# Patient Record
Sex: FEMALE | Race: WHITE | NOT HISPANIC OR LATINO | Employment: OTHER | URBAN - NONMETROPOLITAN AREA
[De-identification: names, ages, dates, MRNs, and addresses within clinical notes are randomized per-mention and may not be internally consistent; named-entity substitution may affect disease eponyms.]

---

## 2023-06-06 ENCOUNTER — HOSPITAL ENCOUNTER (EMERGENCY)
Facility: HOSPITAL | Age: 78
Discharge: HOME | End: 2023-06-07
Attending: EMERGENCY MEDICINE
Payer: MEDICARE

## 2023-06-06 ENCOUNTER — APPOINTMENT (EMERGENCY)
Dept: CT IMAGING | Facility: HOSPITAL | Age: 78
End: 2023-06-06
Payer: MEDICARE

## 2023-06-06 DIAGNOSIS — N20.1 LEFT URETERAL STONE: Primary | ICD-10-CM

## 2023-06-06 LAB
ALBUMIN SERPL BCP-MCNC: 4.4 G/DL (ref 3.5–5)
ALP SERPL-CCNC: 48 U/L (ref 34–104)
ALT SERPL W P-5'-P-CCNC: 20 U/L (ref 7–52)
ANION GAP SERPL CALCULATED.3IONS-SCNC: 13 MMOL/L (ref 4–13)
AST SERPL W P-5'-P-CCNC: 17 U/L (ref 13–39)
BASOPHILS # BLD AUTO: 0.04 THOUSANDS/ÂΜL (ref 0–0.1)
BASOPHILS NFR BLD AUTO: 0 % (ref 0–1)
BILIRUB SERPL-MCNC: 0.88 MG/DL (ref 0.2–1)
BILIRUB UR QL STRIP: NEGATIVE
BUN SERPL-MCNC: 23 MG/DL (ref 5–25)
CALCIUM SERPL-MCNC: 10 MG/DL (ref 8.4–10.2)
CHLORIDE SERPL-SCNC: 100 MMOL/L (ref 96–108)
CLARITY UR: CLEAR
CO2 SERPL-SCNC: 25 MMOL/L (ref 21–32)
COLOR UR: YELLOW
CREAT SERPL-MCNC: 1.09 MG/DL (ref 0.6–1.3)
EOSINOPHIL # BLD AUTO: 0.01 THOUSAND/ÂΜL (ref 0–0.61)
EOSINOPHIL NFR BLD AUTO: 0 % (ref 0–6)
ERYTHROCYTE [DISTWIDTH] IN BLOOD BY AUTOMATED COUNT: 12.9 % (ref 11.6–15.1)
GFR SERPL CREATININE-BSD FRML MDRD: 48 ML/MIN/1.73SQ M
GLUCOSE SERPL-MCNC: 197 MG/DL (ref 65–140)
GLUCOSE UR STRIP-MCNC: NEGATIVE MG/DL
HCT VFR BLD AUTO: 44.8 % (ref 34.8–46.1)
HGB BLD-MCNC: 14.7 G/DL (ref 11.5–15.4)
HGB UR QL STRIP.AUTO: ABNORMAL
IMM GRANULOCYTES # BLD AUTO: 0.03 THOUSAND/UL (ref 0–0.2)
IMM GRANULOCYTES NFR BLD AUTO: 0 % (ref 0–2)
KETONES UR STRIP-MCNC: ABNORMAL MG/DL
LACTATE SERPL-SCNC: 2.2 MMOL/L (ref 0.5–2)
LEUKOCYTE ESTERASE UR QL STRIP: NEGATIVE
LIPASE SERPL-CCNC: 7 U/L (ref 11–82)
LYMPHOCYTES # BLD AUTO: 0.78 THOUSANDS/ÂΜL (ref 0.6–4.47)
LYMPHOCYTES NFR BLD AUTO: 8 % (ref 14–44)
MCH RBC QN AUTO: 31.1 PG (ref 26.8–34.3)
MCHC RBC AUTO-ENTMCNC: 32.8 G/DL (ref 31.4–37.4)
MCV RBC AUTO: 95 FL (ref 82–98)
MONOCYTES # BLD AUTO: 0.66 THOUSAND/ÂΜL (ref 0.17–1.22)
MONOCYTES NFR BLD AUTO: 6 % (ref 4–12)
NEUTROPHILS # BLD AUTO: 8.83 THOUSANDS/ÂΜL (ref 1.85–7.62)
NEUTS SEG NFR BLD AUTO: 86 % (ref 43–75)
NITRITE UR QL STRIP: NEGATIVE
NRBC BLD AUTO-RTO: 0 /100 WBCS
PH UR STRIP.AUTO: 5.5 [PH]
PLATELET # BLD AUTO: 233 THOUSANDS/UL (ref 149–390)
PMV BLD AUTO: 12.4 FL (ref 8.9–12.7)
POTASSIUM SERPL-SCNC: 3.7 MMOL/L (ref 3.5–5.3)
PROCALCITONIN SERPL-MCNC: 0.06 NG/ML
PROT SERPL-MCNC: 6.9 G/DL (ref 6.4–8.4)
PROT UR STRIP-MCNC: NEGATIVE MG/DL
RBC # BLD AUTO: 4.72 MILLION/UL (ref 3.81–5.12)
SODIUM SERPL-SCNC: 138 MMOL/L (ref 135–147)
SP GR UR STRIP.AUTO: >=1.03 (ref 1–1.03)
UROBILINOGEN UR QL STRIP.AUTO: 0.2 E.U./DL
WBC # BLD AUTO: 10.35 THOUSAND/UL (ref 4.31–10.16)

## 2023-06-06 PROCEDURE — 81001 URINALYSIS AUTO W/SCOPE: CPT | Performed by: EMERGENCY MEDICINE

## 2023-06-06 PROCEDURE — G1004 CDSM NDSC: HCPCS

## 2023-06-06 PROCEDURE — 80053 COMPREHEN METABOLIC PANEL: CPT | Performed by: EMERGENCY MEDICINE

## 2023-06-06 PROCEDURE — 83690 ASSAY OF LIPASE: CPT | Performed by: EMERGENCY MEDICINE

## 2023-06-06 PROCEDURE — 84145 PROCALCITONIN (PCT): CPT | Performed by: EMERGENCY MEDICINE

## 2023-06-06 PROCEDURE — 36415 COLL VENOUS BLD VENIPUNCTURE: CPT | Performed by: EMERGENCY MEDICINE

## 2023-06-06 PROCEDURE — 93005 ELECTROCARDIOGRAM TRACING: CPT

## 2023-06-06 PROCEDURE — 74177 CT ABD & PELVIS W/CONTRAST: CPT

## 2023-06-06 PROCEDURE — 85025 COMPLETE CBC W/AUTO DIFF WBC: CPT | Performed by: EMERGENCY MEDICINE

## 2023-06-06 PROCEDURE — 83605 ASSAY OF LACTIC ACID: CPT | Performed by: EMERGENCY MEDICINE

## 2023-06-06 RX ORDER — FENTANYL CITRATE 50 UG/ML
50 INJECTION, SOLUTION INTRAMUSCULAR; INTRAVENOUS ONCE
Status: COMPLETED | OUTPATIENT
Start: 2023-06-06 | End: 2023-06-06

## 2023-06-06 RX ADMIN — SODIUM CHLORIDE 500 ML: 0.9 INJECTION, SOLUTION INTRAVENOUS at 23:25

## 2023-06-06 RX ADMIN — FENTANYL CITRATE 50 MCG: 50 INJECTION, SOLUTION INTRAMUSCULAR; INTRAVENOUS at 23:26

## 2023-06-06 RX ADMIN — IOHEXOL 100 ML: 350 INJECTION, SOLUTION INTRAVENOUS at 23:58

## 2023-06-07 ENCOUNTER — TELEPHONE (OUTPATIENT)
Dept: UROLOGY | Facility: AMBULATORY SURGERY CENTER | Age: 78
End: 2023-06-07

## 2023-06-07 VITALS
RESPIRATION RATE: 17 BRPM | WEIGHT: 216.71 LBS | TEMPERATURE: 98.2 F | OXYGEN SATURATION: 94 % | DIASTOLIC BLOOD PRESSURE: 61 MMHG | HEART RATE: 103 BPM | SYSTOLIC BLOOD PRESSURE: 132 MMHG

## 2023-06-07 LAB
ATRIAL RATE: 103 BPM
BACTERIA UR QL AUTO: NORMAL /HPF
NON-SQ EPI CELLS URNS QL MICRO: NORMAL /HPF
P AXIS: 62 DEGREES
PR INTERVAL: 188 MS
QRS AXIS: 45 DEGREES
QRSD INTERVAL: 82 MS
QT INTERVAL: 340 MS
QTC INTERVAL: 445 MS
RBC #/AREA URNS AUTO: NORMAL /HPF
T WAVE AXIS: 47 DEGREES
VENTRICULAR RATE: 103 BPM
WBC #/AREA URNS AUTO: NORMAL /HPF

## 2023-06-07 PROCEDURE — 93010 ELECTROCARDIOGRAM REPORT: CPT | Performed by: INTERNAL MEDICINE

## 2023-06-07 RX ORDER — TAMSULOSIN HYDROCHLORIDE 0.4 MG/1
0.4 CAPSULE ORAL ONCE
Status: COMPLETED | OUTPATIENT
Start: 2023-06-07 | End: 2023-06-07

## 2023-06-07 RX ORDER — ONDANSETRON 4 MG/1
4 TABLET, ORALLY DISINTEGRATING ORAL EVERY 6 HOURS PRN
Qty: 20 TABLET | Refills: 0 | Status: SHIPPED | OUTPATIENT
Start: 2023-06-07

## 2023-06-07 RX ORDER — TAMSULOSIN HYDROCHLORIDE 0.4 MG/1
0.4 CAPSULE ORAL
Qty: 10 CAPSULE | Refills: 0 | Status: SHIPPED | OUTPATIENT
Start: 2023-06-07

## 2023-06-07 RX ORDER — NAPROXEN 500 MG/1
500 TABLET ORAL 2 TIMES DAILY WITH MEALS
Qty: 20 TABLET | Refills: 0 | Status: SHIPPED | OUTPATIENT
Start: 2023-06-07

## 2023-06-07 RX ADMIN — TAMSULOSIN HYDROCHLORIDE 0.4 MG: 0.4 CAPSULE ORAL at 01:48

## 2023-06-07 NOTE — TELEPHONE ENCOUNTER
Called and spoke with patient  She wishes to transfer care to [facility]  She was scheduled for NP appointment with Lanette Mendez in [place] on 6/22 @ 1000  Office address provided

## 2023-06-07 NOTE — DISCHARGE INSTRUCTIONS
You have been seen for flank pain  Your CT demonstrates a left sided kidney stone  Please discontinue other NSAIDs and take naproxen for pain  Please take the zofran and flomax as prescribed  Return to the emergency department if you develop worsening pain, vomiting, fevers or any other symptoms of concern  Please follow up with urology by calling the number provided

## 2023-06-07 NOTE — ED PROVIDER NOTES
History  Chief Complaint   Patient presents with   • Abdominal Pain     Left  sided flank pain that radiated to front  Started around 3pm  No urinary frequency,urgency,burning  History of kidney stones  History of diverticulitis  Report arleen all day  Talon Dickerson is a 76y o  year old female with PMH of DM, HTN, GERD, kidney stones presenting to the [facility] ED for left flank/abdominal pain  Patient noticed onset of pain five fours prior to arrival  Pain located in left flank/LLQ of abdomen  Rated 7-9/10  Associated nausea, NB/NB vomiting and brown watery diarrhea  Patient denies fevers/chills  No dysuria/hematuria  Patient has taken ibuprofen at home for symptomatic treatment  Surgical history notable for cholecystectomy  History provided by:  Medical records and patient   used: No        Prior to Admission Medications   Prescriptions Last Dose Informant Patient Reported?  Taking?   aspirin (ECOTRIN LOW STRENGTH) 81 mg EC tablet   Yes No   Sig: Take 81 mg by mouth daily   citric acid-potassium citrate (POLYCITRA K) 1,100-334 mg/5 mL solution   Yes No   Sig: Take by mouth 3 (three) times a day with meals   esomeprazole (NexIUM) 40 MG capsule   Yes No   Sig: Take 40 mg by mouth every morning before breakfast   fluticasone (FLONASE) 50 mcg/act nasal spray   No No   Si spray into each nostril daily   meloxicam (MOBIC) 15 mg tablet   Yes No   Sig: Take 15 mg by mouth daily   metFORMIN (GLUCOPHAGE) 500 mg tablet   Yes No   Sig: Take 500 mg by mouth 2 (two) times a day with meals   metoprolol succinate (TOPROL-XL) 50 mg 24 hr tablet   Yes No   Sig: Take 50 mg by mouth daily   olmesartan-hydrochlorothiazide (BENICAR HCT) 20-12 5 MG per tablet   Yes No   Sig: Take 1 tablet by mouth daily      Facility-Administered Medications: None       Past Medical History:   Diagnosis Date   • Diabetes mellitus ([facility] [address] )    • Diverticulitis    • GERD (gastroesophageal reflux disease)    • Hypertension        No past surgical history on file  No family history on file  I have reviewed and agree with the history as documented  E-Cigarette/Vaping     E-Cigarette/Vaping Substances          Review of Systems   Constitutional: Negative for chills and fever  HENT: Negative for congestion  Respiratory: Negative for shortness of breath  Cardiovascular: Negative for chest pain  Gastrointestinal: Positive for abdominal pain, diarrhea, nausea and vomiting  Negative for anal bleeding  Genitourinary: Positive for flank pain  Negative for dysuria and hematuria  Musculoskeletal: Negative for back pain  Skin: Negative for rash  Neurological: Negative for weakness  Psychiatric/Behavioral: Negative for confusion  All other systems reviewed and are negative  Physical Exam  Physical Exam  Vitals and nursing note reviewed  Constitutional:       General: She is not in acute distress  Appearance: Normal appearance  She is well-developed  She is not ill-appearing, toxic-appearing or diaphoretic  HENT:      Head: Normocephalic and atraumatic  Nose: No congestion or rhinorrhea  Eyes:      General:         Right eye: No discharge  Left eye: No discharge  Cardiovascular:      Rate and Rhythm: Regular rhythm  Tachycardia present  Pulmonary:      Effort: Pulmonary effort is normal  No accessory muscle usage or respiratory distress  Breath sounds: Normal breath sounds  No stridor  No decreased breath sounds, wheezing, rhonchi or rales  Abdominal:      General: There is no distension  Palpations: Abdomen is soft  Tenderness: There is abdominal tenderness in the left lower quadrant  There is no right CVA tenderness, left CVA tenderness, guarding or rebound  Musculoskeletal:      Cervical back: Normal range of motion and neck supple  No rigidity  Right lower leg: No tenderness  Left lower leg: No tenderness     Skin:     Capillary Refill: Capillary refill takes less than 2 seconds  Findings: No rash  Neurological:      Mental Status: She is alert and oriented to person, place, and time     Psychiatric:         Mood and Affect: Mood normal          Behavior: Behavior normal          Vital Signs  ED Triage Vitals   Temperature Pulse Respirations Blood Pressure SpO2   06/06/23 2245 06/06/23 2241 06/06/23 2241 06/06/23 2245 06/06/23 2241   98 2 °F (36 8 °C) (!) 115 18 159/77 96 %      Temp Source Heart Rate Source Patient Position - Orthostatic VS BP Location FiO2 (%)   06/06/23 2245 06/06/23 2241 06/06/23 2241 06/06/23 2241 --   Temporal Monitor Lying Left arm       Pain Score       06/06/23 2241       7           Vitals:    06/06/23 2245 06/06/23 2340 06/07/23 0110 06/07/23 0140   BP: 159/77 148/65 144/83 132/61   Pulse:  98 97 103   Patient Position - Orthostatic VS:             Visual Acuity      ED Medications  Medications   sodium chloride 0 9 % bolus 500 mL (0 mL Intravenous Stopped 6/7/23 0025)   fentanyl citrate (PF) 100 MCG/2ML 50 mcg (50 mcg Intravenous Given 6/6/23 2326)   iohexol (OMNIPAQUE) 350 MG/ML injection (SINGLE-DOSE) 100 mL (100 mL Intravenous Given 6/6/23 2358)   tamsulosin (FLOMAX) capsule 0 4 mg (0 4 mg Oral Given 6/7/23 0148)       Diagnostic Studies  Results Reviewed     Procedure Component Value Units Date/Time    Urine Microscopic [[id number]]  (Normal) Collected: 06/06/23 2258    Lab Status: Final result Specimen: Urine, Clean Catch Updated: 06/07/23 0004     RBC, UA 1-2 /hpf      WBC, UA 1-2 /hpf      Epithelial Cells None Seen /hpf      Bacteria, UA None Seen /hpf     UA w Reflex to Microscopic w Reflex to Culture [[id number]]  (Abnormal) Collected: 06/06/23 2258    Lab Status: Final result Specimen: Urine, Clean Catch Updated: 06/06/23 2348     Color, UA Yellow     Clarity, UA Clear     Specific Gravity, UA >=1 030     pH, UA 5 5     Leukocytes, UA Negative     Nitrite, UA Negative     Protein, UA Negative mg/dl Glucose, UA Negative mg/dl      Ketones, UA 40 (2+) mg/dl      Urobilinogen, UA 0 2 E U /dl      Bilirubin, UA Negative     Occult Blood, UA Trace-Intact    Lactic acid, plasma (w/reflex if result > 2 0) [[id number]]  (Abnormal) Collected: 06/06/23 2256    Lab Status: Final result Specimen: Blood from Arm, Left Updated: 06/06/23 2340     LACTIC ACID 2 2 mmol/L     Narrative:      Result may be elevated if tourniquet was used during collection      Lactic acid 2 Hours [[id number]]     Lab Status: No result Specimen: Blood     Procalcitonin [[id number]]  (Normal) Collected: 06/06/23 2256    Lab Status: Final result Specimen: Blood from Arm, Left Updated: 06/06/23 2333     Procalcitonin 0 06 ng/ml     Comprehensive metabolic panel [[id number]]  (Abnormal) Collected: 06/06/23 2256    Lab Status: Final result Specimen: Blood from Arm, Left Updated: 06/06/23 2324     Sodium 138 mmol/L      Potassium 3 7 mmol/L      Chloride 100 mmol/L      CO2 25 mmol/L      ANION GAP 13 mmol/L      BUN 23 mg/dL      Creatinine 1 09 mg/dL      Glucose 197 mg/dL      Calcium 10 0 mg/dL      AST 17 U/L      ALT 20 U/L      Alkaline Phosphatase 48 U/L      Total Protein 6 9 g/dL      Albumin 4 4 g/dL      Total Bilirubin 0 88 mg/dL      eGFR 48 ml/min/1 73sq m     Narrative:      [place] guidelines for Chronic Kidney Disease (CKD):   •  Stage 1 with normal or high GFR (GFR > 90 mL/min/1 73 square meters)  •  Stage 2 Mild CKD (GFR = 60-89 mL/min/1 73 square meters)  •  Stage 3A Moderate CKD (GFR = 45-59 mL/min/1 73 square meters)  •  Stage 3B Moderate CKD (GFR = 30-44 mL/min/1 73 square meters)  •  Stage 4 Severe CKD (GFR = 15-29 mL/min/1 73 square meters)  •  Stage 5 End Stage CKD (GFR <15 mL/min/1 73 square meters)  Note: GFR calculation is accurate only with a steady state creatinine    Lipase [[id number]]  (Abnormal) Collected: 06/06/23 2256    Lab Status: Final result Specimen: Blood from Arm, Left Updated: 06/06/23 2324     Lipase 7 u/L     CBC and differential [[id number]]  (Abnormal) Collected: 06/06/23 2256    Lab Status: Final result Specimen: Blood from Arm, Left Updated: 06/06/23 2303     WBC 10 35 Thousand/uL      RBC 4 72 Million/uL      Hemoglobin 14 7 g/dL      Hematocrit 44 8 %      MCV 95 fL      MCH 31 1 pg      MCHC 32 8 g/dL      RDW 12 9 %      MPV 12 4 fL      Platelets 642 Thousands/uL      nRBC 0 /100 WBCs      Neutrophils Relative 86 %      Immat GRANS % 0 %      Lymphocytes Relative 8 %      Monocytes Relative 6 %      Eosinophils Relative 0 %      Basophils Relative 0 %      Neutrophils Absolute 8 83 Thousands/µL      Immature Grans Absolute 0 03 Thousand/uL      Lymphocytes Absolute 0 78 Thousands/µL      Monocytes Absolute 0 66 Thousand/µL      Eosinophils Absolute 0 01 Thousand/µL      Basophils Absolute 0 04 Thousands/µL                  CT abdomen pelvis with contrast   ED Interpretation by Laisha Arroyo DO (06/07 0002)   Left hydroureteronephrosis due to obstructing midureteral stone  Final Result by Bonifacio Enriquez MD (06/07 0130)      3 mm stone in the proximal left ureter resulting left hydroureteronephrosis  There is a nonobstructing 4 mm stone in the left renal collecting system         The study was marked in EPIC for immediate notification  Workstation performed: [id number]                    Procedures  Procedures         ED Course  ED Course as of 06/07/23 0253   Tue Jun 06, 2023 2319 Procedure Note: EKG  Date/Time: 06/06/23 11:19 PM   Interpreted by: Laisha Arroyo DO  Indications / Diagnosis: Dyspnea  ECG reviewed by me, the ED Provider: yes   The EKG demonstrates:  Rhythm: sinus tachycardia 103 BPM  Intervals: Normal CA and QT intervals  Axis: Normal axis  QRS/Blocks: Normal QRS  ST Changes: No acute ST/T waves changes  No TILA  No TWI  Wed Jun 07, 2023   0018 Patient reassessed  Pain resolved  Vital stable  Reviewed labs with patient  Pending CT for dispo     4212 Patient continues to deny pain  Well appearing  Reviewed CT results with patient  Will plan for discharge with outpatient urology f/u  SBIRT 22yo+    Flowsheet Row Most Recent Value   Initial Alcohol Screen: US AUDIT-C     1  How often do you have a drink containing alcohol? 0 Filed at: 06/06/2023 2237   Audit-C Score 0 Filed at: 06/06/2023 2237                    Medical Decision Making    76 y o  female presenting for abdominal pain, vomiting and diarrhea  Tachycardia noted, otherwise VSS  Patient nontoxic appearing  Will order labs, UA and CT to evaluate for kidney stones, pyelonephritis, diverticulitis or other etiology of pain  Lower suspicion for AAA, appendicitis, SBO or mesenteric ischemia  Lower suspicion for ACS though given age will order EKG to screen for arrhythmia or ACS  DDX includes gastroenteritis  Reassessment: Pain resolved during ED course  Vitals within normal limits  Reviewed labs and CT results with the patient and family  3 mm distal ureteral stone with resultant hydronephrosis  Nausea and pain controlled, no signs/symptoms of UTI and no STACY  Will plan for outpatient treatment and prompt urology follow-up  Disposition: I have discussed with the patient our plan to discharge them from the ED and the patient is in agreement with this plan  Discharge Plan: Prescription for Flomax, naproxen, Zofran  Discussed possibility of disease progression and failure of outpatient treatment  RTED precautions emphasized  The patient was provided a written after visit summary with strict RTED precautions  Followup: I have discussed with the patient plan to follow up with Urology  Contact information provided in AVS     Amount and/or Complexity of Data Reviewed  Labs: ordered  Radiology: ordered and independent interpretation performed  Risk  Prescription drug management            Disposition  Final diagnoses:   Left ureteral stone     Time reflects when diagnosis was documented in both MDM as applicable and the Disposition within this note     Time User Action Codes Description Comment    6/7/2023  1:36 AM Hieu Cano Add [N20 1] Left ureteral stone       ED Disposition     ED Disposition   Discharge    Condition   Stable    Date/Time   Wed Jun 7, 2023  1:35 AM    Comment   Arcelialarisa Van discharge to home/self care  Follow-up Information     Follow up With Specialties Details Why Contact Info Additional [address] [facility] Schedule an appointment as soon as possible for a visit  To make appointment for reevaluation in 3-5 days   [address] [facility], [address] [place] [address], [address] [place] [address]          Discharge Medication List as of 6/7/2023  1:37 AM      START taking these medications    Details   naproxen (Naprosyn) 500 mg tablet Take 1 tablet (500 mg total) by mouth 2 (two) times a day with meals, Starting Wed 6/7/2023, Normal      ondansetron (ZOFRAN-ODT) 4 mg disintegrating tablet Take 1 tablet (4 mg total) by mouth every 6 (six) hours as needed for nausea or vomiting, Starting Wed 6/7/2023, Normal      tamsulosin (FLOMAX) 0 4 mg Take 1 capsule (0 4 mg total) by mouth daily with dinner, Starting Wed 6/7/2023, Normal         CONTINUE these medications which have NOT CHANGED    Details   aspirin (ECOTRIN LOW STRENGTH) 81 mg EC tablet Take 81 mg by mouth daily, Historical Med      citric acid-potassium citrate (POLYCITRA K) 1,100-334 mg/5 mL solution Take by mouth 3 (three) times a day with meals, Historical Med      esomeprazole (NexIUM) 40 MG capsule Take 40 mg by mouth every morning before breakfast, Historical Med      fluticasone (FLONASE) 50 mcg/act nasal spray 1 spray into each nostril daily, Starting Sun 4/16/2023, Normal      meloxicam (MOBIC) 15 mg tablet Take 15 mg by mouth daily, Historical Med      metFORMIN (GLUCOPHAGE) 500 mg tablet Take 500 mg by mouth 2 (two) times a day with meals, Historical Med      metoprolol succinate (TOPROL-XL) 50 mg 24 hr tablet Take 50 mg by mouth daily, Historical Med      olmesartan-hydrochlorothiazide (BENICAR HCT) 20-12 5 MG per tablet Take 1 tablet by mouth daily, Historical Med                 PDMP Review     None          ED Provider  Electronically Signed by           Krunal Aguirre DO  06/07/23 4483

## 2023-06-07 NOTE — TELEPHONE ENCOUNTER
New Patient    What is the reason for the patient’s appointment?: referral ER Kidney stones     She said she is feeling better and does have a history of kidney stones in the past  CT shoed 2 mm stone in left ureter left hydroureteronephrosis 4 mm stone in left    What office location does the patient prefer?: [facility]    Does patient have Imaging/Lab Results:  yes    Have patient records been requested?: no  If No, are the records showing in Epic: yes      INSURANCE:  Do we accept the patient's insurance or is the patient Self-Pay?: yes    Insurance Provider: [email or web address]  Plan Type/Number:  Member ID#:       HISTORY:   Has the patient had any previous Urologist(s)?: yes [facility]    Was the patient seen in the ED?: yes    Has the patient had any outside testing done?: no    Does the patient have a personal history of cancer?: no    Pt can be reached at [phone]

## 2023-06-22 ENCOUNTER — OFFICE VISIT (OUTPATIENT)
Dept: UROLOGY | Facility: CLINIC | Age: 78
End: 2023-06-22
Payer: MEDICARE

## 2023-06-22 VITALS
WEIGHT: 214 LBS | HEIGHT: 62 IN | BODY MASS INDEX: 39.38 KG/M2 | SYSTOLIC BLOOD PRESSURE: 114 MMHG | DIASTOLIC BLOOD PRESSURE: 72 MMHG

## 2023-06-22 DIAGNOSIS — N20.1 URETEROLITHIASIS: Primary | ICD-10-CM

## 2023-06-22 PROCEDURE — 99203 OFFICE O/P NEW LOW 30 MIN: CPT

## 2023-06-22 PROCEDURE — 82360 CALCULUS ASSAY QUANT: CPT

## 2023-06-22 RX ORDER — BLOOD-GLUCOSE METER
KIT MISCELLANEOUS
COMMUNITY
Start: 2023-03-18

## 2023-06-22 RX ORDER — POTASSIUM CITRATE 10 MEQ/1
TABLET, EXTENDED RELEASE ORAL
COMMUNITY
Start: 2023-06-08

## 2023-06-22 RX ORDER — BLOOD-GLUCOSE METER
KIT MISCELLANEOUS
COMMUNITY
Start: 2023-05-01

## 2023-06-22 RX ORDER — METOPROLOL TARTRATE 50 MG/1
TABLET, FILM COATED ORAL
COMMUNITY
Start: 2023-06-08

## 2023-06-22 RX ORDER — RIBOFLAVIN (VITAMIN B2) 100 MG
100 TABLET ORAL DAILY
COMMUNITY

## 2023-06-22 NOTE — PROGRESS NOTES
6/22/2023    No chief complaint on file  Assessment and Plan    76 y o  female new patient to office    1  Ureterolithiasis  · On 6/6/2023 for left flank pain, CT imaging showed 3 mm stone in the proximal left ureter resulting in left-sided hydronephrosis  · She was able to spontaneously pass this stone about 2 weeks ago and was able to collect it and we will send this for stone analysis  · She is asymptomatic today and denies any gross hematuria  I am recommending a Renal US to ensure resolution of the hydronephrosis despite being pain free  If this is resolved, she can then follow-up in 1 year  · Continue potasium citrate          History of Present Illness  Cassidy Sanz is a 76 y o  female new patient to office  Here for evaluation of     Emergency department visit on 6/6/2023 at [address] [facility] for left-sided flank pain with radiation to the abdomen  CT imaging showed 3 mm stone in the proximal left ureter resulting left hydroureteronephrosis  There is a nonobstructing 4 mm stone in the left renal collecting system  Review of labs showed a very slight leukocytosis of 10 35, normal renal function, and urine negative for infection  She does have a history of kidney stones and had previously been following with [facility] urology last seen by them in August 2022  Her first stone episode was in the early 2000's then in 2015 and 2017  She had 2 lithotripsy procedures with last in 2017  She is on K citrate 20 mill equivalents twice daily  Review of Systems   Constitutional: Negative for chills and fever  HENT: Negative for ear pain and sore throat  Eyes: Negative for pain and visual disturbance  Respiratory: Negative for cough and shortness of breath  Cardiovascular: Negative for chest pain and palpitations  Gastrointestinal: Negative for abdominal pain and vomiting  Genitourinary: Negative for dysuria and hematuria  Musculoskeletal: Negative for arthralgias and back pain  "  Skin: Negative for color change and rash  Neurological: Negative for seizures and syncope  All other systems reviewed and are negative  Vitals  Vitals:    06/22/23 0955   BP: 114/72   BP Location: Right arm   Patient Position: Sitting   Cuff Size: Large   Weight: 97 1 kg (214 lb)   Height: 5' 2\" (1 575 m)       Physical Exam  Vitals reviewed  Constitutional:       General: She is not in acute distress  Appearance: Normal appearance  She is normal weight  She is not ill-appearing or toxic-appearing  HENT:      Head: Normocephalic and atraumatic  Nose: Nose normal    Eyes:      General: No scleral icterus  Conjunctiva/sclera: Conjunctivae normal    Cardiovascular:      Rate and Rhythm: Normal rate  Pulses: Normal pulses  Pulmonary:      Effort: Pulmonary effort is normal  No respiratory distress  Abdominal:      General: Abdomen is flat  Palpations: Abdomen is soft  Tenderness: There is no abdominal tenderness  There is no right CVA tenderness or left CVA tenderness  Hernia: No hernia is present  Musculoskeletal:         General: Normal range of motion  Cervical back: Normal range of motion  Skin:     General: Skin is warm and dry  Neurological:      General: No focal deficit present  Mental Status: She is alert and oriented to person, place, and time  Mental status is at baseline  Psychiatric:         Mood and Affect: Mood normal          Behavior: Behavior normal          Thought Content: Thought content normal          Judgment: Judgment normal          Past History  Past Medical History:   Diagnosis Date   • Diabetes mellitus ([facility] )    • Diverticulitis    • GERD (gastroesophageal reflux disease)    • Hypertension      Social History     Socioeconomic History   • Marital status:       Spouse name: None   • Number of children: None   • Years of education: None   • Highest education level: None   Occupational History   • None   Tobacco " Use   • Smoking status: Never   • Smokeless tobacco: Never   Vaping Use   • Vaping Use: Never used   Substance and Sexual Activity   • Alcohol use: None   • Drug use: None   • Sexual activity: None   Other Topics Concern   • None   Social History Narrative   • None     Social Determinants of Health     Financial Resource Strain: Not on file   Food Insecurity: Not on file   Transportation Needs: Not on file   Physical Activity: Not on file   Stress: Not on file   Social Connections: Not on file   Intimate Partner Violence: Not on file   Housing Stability: Not on file     Social History     Tobacco Use   Smoking Status Never   Smokeless Tobacco Never     History reviewed  No pertinent family history  The following portions of the patient's history were reviewed and updated as appropriate allergies, current medications, past medical history, past social history, past surgical history and problem list    [email or web address]  CT ABDOMEN AND PELVIS WITH IV CONTRAST     INDICATION:   LLQ abdominal pain  Left flank and LLQ pain  Eval for diverticulitis, kidney stone        COMPARISON:  None      TECHNIQUE:  CT examination of the abdomen and pelvis was performed  Multiplanar 2D reformatted images were created from the source data      This examination, like all CT scans performed in the [facility], was performed utilizing techniques to minimize radiation dose exposure, including the use of iterative reconstruction and automated exposure control   Radiation dose length   product (DLP) for this visit:  1052 mGy-cm     IV Contrast:  100 mL of iohexol (OMNIPAQUE)  Enteric Contrast:  Enteric contrast was not administered      FINDINGS:     ABDOMEN     LOWER CHEST: Cardiomegaly is noted      LIVER/BILIARY TREE:  Unremarkable      GALLBLADDER: Gallbladder is surgically absent      SPLEEN:  Unremarkable      PANCREAS:  Unremarkable      ADRENAL GLANDS:  Unremarkable      KIDNEYS/URETERS: 3 mm stone in the "proximal left ureter resulting left hydroureteronephrosis  There is a nonobstructing 4 mm stone in the left renal collecting system        STOMACH AND BOWEL:  Unremarkable      APPENDIX:  No findings to suggest appendicitis      ABDOMINOPELVIC CAVITY:  No ascites  No pneumoperitoneum  No lymphadenopathy      VESSELS:  Unremarkable for patient's age      PELVIS     REPRODUCTIVE ORGANS:  Unremarkable for patient's age      URINARY BLADDER:  Unremarkable      ABDOMINAL WALL/INGUINAL REGIONS: Fat-containing umbilical hernia is noted    There is a 7 8 x 3 9 cm lipoma in the left anterior thigh      OSSEOUS STRUCTURES:  No acute fracture or destructive osseous lesion      IMPRESSION:     3 mm stone in the proximal left ureter resulting left hydroureteronephrosis  There is a nonobstructing 4 mm stone in the left renal collecting system  Clydegene Stubbs Results  No results found for this or any previous visit (from the past 1 hour(s))  ]  No results found for: \"PSA\"  Lab Results   Component Value Date    CALCIUM 10 0 06/06/2023    K 3 7 06/06/2023    CO2 25 06/06/2023     06/06/2023    BUN 23 06/06/2023    CREATININE 1 09 06/06/2023     Lab Results   Component Value Date    WBC 10 35 (H) 06/06/2023    HGB 14 7 06/06/2023    HCT 44 8 06/06/2023    MCV 95 06/06/2023     06/06/2023       Please Note:  Voice dictation software has been used to create this document  There may be inadvertent transcriptions errors       ADRIANA Paulson 06/22/23   "

## 2024-06-25 ENCOUNTER — OFFICE VISIT (OUTPATIENT)
Dept: UROLOGY | Facility: CLINIC | Age: 79
End: 2024-06-25
Payer: MEDICARE

## 2024-06-25 VITALS
WEIGHT: 208 LBS | BODY MASS INDEX: 38.04 KG/M2 | DIASTOLIC BLOOD PRESSURE: 74 MMHG | HEART RATE: 68 BPM | SYSTOLIC BLOOD PRESSURE: 136 MMHG

## 2024-06-25 DIAGNOSIS — N20.0 NEPHROLITHIASIS: Primary | ICD-10-CM

## 2024-06-25 PROCEDURE — 99213 OFFICE O/P EST LOW 20 MIN: CPT

## 2024-06-25 RX ORDER — METFORMIN HYDROCHLORIDE 500 MG/1
500 TABLET, EXTENDED RELEASE ORAL
COMMUNITY
Start: 2024-05-20

## 2024-06-25 NOTE — PROGRESS NOTES
6/25/2024    No chief complaint on file.      Assessment and Plan    78 y.o. female manage by    1.  Nephrolithiasis  Renal ultrasound from July 2023 showed no stones bilaterally.  I recommend checking an updated ultrasound and if she remains stone free we can discontinue her potassium citrate.  Follow-up in 1 year                Interval HPI:    She presents today reporting doing well and denies any issues with recently passed stones.  We reviewed her ultrasound from July of last year which showed no stone disease bilaterally.  She continues with potassium citrate.        History of Present Illness  Kim Kwon is a 78 y.o. female here for annual follow-up evaluation of history of nephrolithiasis.    Established patient last seen by me in June 2023 after she had an acute ER visit on 6/6/2023 at [facility] for left-sided flank pain.  CT imaging showed a 3 mm stone in the proximal left ureter resulting in left-sided hydronephrosis.  She was able to spontaneously pass the stone about 2 weeks prior to her last visit.  I recommended a renal ultrasound to ensure resolution of hydronephrosis despite being pain-free.  This was completed on 7/3/2023 and showed no evidence of renal calculi or hydronephrosis bilaterally.  She has 2 left renal cyst measuring 1.7 cm in the midpole and lower pole.    She does have a history of kidney stones and had previously been following with [facility] urology last seen by them in August 2022. Her first stone episode was in the early 2000's then in 2015 and 2017. She had 2 lithotripsy procedures with last in 2017. She is on K citrate 20 mill equivalents twice daily.         Review of Systems   Constitutional:  Negative for chills and fever.   HENT:  Negative for ear pain and sore throat.    Eyes:  Negative for pain and visual disturbance.   Respiratory:  Negative for cough and shortness of breath.    Cardiovascular:  Negative for chest pain and palpitations.   Gastrointestinal:   Negative for abdominal pain and vomiting.   Genitourinary:  Negative for dysuria and hematuria.   Musculoskeletal:  Negative for arthralgias and back pain.   Skin:  Negative for color change and rash.   Neurological:  Negative for seizures and syncope.   All other systems reviewed and are negative.              Vitals  Vitals:    06/25/24 0927 06/25/24 0933   BP:  136/74   Pulse:  68   Weight: 94.3 kg (208 lb)        Physical Exam  Vitals reviewed.   Constitutional:       General: She is not in acute distress.     Appearance: Normal appearance. She is normal weight. She is not ill-appearing or toxic-appearing.   HENT:      Head: Normocephalic and atraumatic.      Nose: Nose normal.   Eyes:      General: No scleral icterus.     Conjunctiva/sclera: Conjunctivae normal.   Cardiovascular:      Rate and Rhythm: Normal rate.      Pulses: Normal pulses.   Pulmonary:      Effort: Pulmonary effort is normal. No respiratory distress.   Abdominal:      General: Abdomen is flat.      Palpations: Abdomen is soft.      Tenderness: There is no abdominal tenderness. There is no right CVA tenderness or left CVA tenderness.      Hernia: No hernia is present.   Musculoskeletal:         General: Normal range of motion.      Cervical back: Normal range of motion.   Skin:     General: Skin is warm and dry.   Neurological:      General: No focal deficit present.      Mental Status: She is alert and oriented to person, place, and time. Mental status is at baseline.   Psychiatric:         Mood and Affect: Mood normal.         Behavior: Behavior normal.         Thought Content: Thought content normal.         Judgment: Judgment normal.         Past History  Past Medical History:   Diagnosis Date    Diabetes mellitus (HCC)     Diverticulitis     GERD (gastroesophageal reflux disease)     Hypertension      Social History     Socioeconomic History    Marital status:      Spouse name: None    Number of children: None    Years of  education: None    Highest education level: None   Occupational History    None   Tobacco Use    Smoking status: Never    Smokeless tobacco: Never   Vaping Use    Vaping status: Never Used   Substance and Sexual Activity    Alcohol use: None    Drug use: None    Sexual activity: None   Other Topics Concern    None   Social History Narrative    None     Social Determinants of Health     Financial Resource Strain: Low Risk  (8/23/2023)    Received from [facility]    Overall Financial Resource Strain (CARDIA)     Difficulty of Paying Living Expenses: Not hard at all   Food Insecurity: No Food Insecurity (8/23/2023)    Received from [facility]    Hunger Vital Sign     Worried About Running Out of Food in the Last Year: Never true     Ran Out of Food in the Last Year: Never true   Transportation Needs: No Transportation Needs (8/23/2023)    Received from [facility]    PRAPARE - Transportation     Lack of Transportation (Medical): No     Lack of Transportation (Non-Medical): No   Physical Activity: Not on file   Stress: No Stress Concern Present (8/23/2023)    Received from [facility]    [employer] - Occupational Stress Questionnaire     Feeling of Stress : Not at all   Social Connections: Unknown (6/18/2024)    Received from [facility]     How often do you feel lonely or isolated from those around you? (Adult - for ages 18 years and over): Not on file   Intimate Partner Violence: Not At Risk (8/23/2023)    Received from [facility]    Humiliation, Afraid, Rape, and Kick questionnaire     Fear of Current or Ex-Partner: No     Emotionally Abused: No     Physically Abused: No     Sexually Abused: No   Housing Stability: Not on file     Social History     Tobacco Use   Smoking Status Never   Smokeless Tobacco Never     History reviewed. No pertinent family history.    The following  "portions of the patient's history were reviewed and updated as appropriate allergies, current medications, past medical history, past social history, past surgical history and problem list    [email or web address]    US KIDNEYS/RENAL  Order: [id number]  Impression    Impression:  1. No evidence of renal calculi or hydronephrosis.  2. Persistent left renal cysts.          Workstation:[id number]  Narrative    History: Evaluate for kidney stones.    Technique: Color-flow Doppler and grayscale imaging was obtained through each  kidney as well as the bladder.    Comparison: Renal ultrasound on 2021.    Findings: The kidneys measure 9.5 x 5.1 x 4.5 cm on the right and 10.5 x 5.4 x  3.5 cm on the left.    Each kidney is free of calculus or hydronephrosis. There are no cysts or masses  on the right. Left renal cysts are again noted measuring 1.7 x 1.1 x 0.9 cm at  the mid pole and 1.7 x 1.2 x 0.9 cm on the lower pole.    Bladder is smoothly marginated without wall thickening, mass or calculus.      Results  No results found for this or any previous visit (from the past 1 hour(s)).]  No results found for: \"PSA\"  Lab Results   Component Value Date    CALCIUM 10.1 2023    K 4.2 2023    CO2 28 2023     2023    BUN 25 2023    CREATININE 0.93 2023     Lab Results   Component Value Date    WBC 10.35 (H) 2023    HGB 14.7 2023    HCT 44.8 2023    MCV 95 2023     2023       Please Note:  Voice dictation software has been used to create this document. There may be inadvertent transcriptions errors.     ADRIANA Ngo 24   "

## 2024-07-10 ENCOUNTER — TELEPHONE (OUTPATIENT)
Dept: UROLOGY | Facility: CLINIC | Age: 79
End: 2024-07-10

## 2024-07-10 NOTE — TELEPHONE ENCOUNTER
----- Message from ADRIANA Boykin sent at 7/10/2024  8:57 AM EDT -----  Please let patient know that her renal ultrasound confirms no stones.  She can discontinue potassium citrate.  Her ultrasound does show bilateral simple renal cyst.  These do not require further imaging or workup.

## 2024-07-10 NOTE — TELEPHONE ENCOUNTER
Per communication consent left detailed message of provider's result note. Office number provided if any questions.

## 2024-10-12 ENCOUNTER — OFFICE VISIT (OUTPATIENT)
Dept: URGENT CARE | Facility: CLINIC | Age: 79
End: 2024-10-12
Payer: MEDICARE

## 2024-10-12 VITALS
OXYGEN SATURATION: 98 % | SYSTOLIC BLOOD PRESSURE: 142 MMHG | DIASTOLIC BLOOD PRESSURE: 75 MMHG | TEMPERATURE: 97.9 F | HEART RATE: 78 BPM | RESPIRATION RATE: 18 BRPM

## 2024-10-12 DIAGNOSIS — J01.40 ACUTE PANSINUSITIS, RECURRENCE NOT SPECIFIED: Primary | ICD-10-CM

## 2024-10-12 PROCEDURE — G0463 HOSPITAL OUTPT CLINIC VISIT: HCPCS

## 2024-10-12 PROCEDURE — 99213 OFFICE O/P EST LOW 20 MIN: CPT

## 2024-10-12 RX ORDER — IBUPROFEN 200 MG
200 TABLET ORAL EVERY 6 HOURS PRN
COMMUNITY

## 2024-10-12 RX ORDER — KETOROLAC TROMETHAMINE 5 MG/ML
1 SOLUTION OPHTHALMIC 4 TIMES DAILY
COMMUNITY
Start: 2024-10-04

## 2024-10-12 NOTE — PROGRESS NOTES
[facility]        NAME: Kim Kwon is a 78 y.o. female  : 1945    MRN: [id number]  DATE: 2024  TIME: 12:38 PM    Assessment and Plan   Acute pansinusitis, recurrence not specified [J01.40]  1. Acute pansinusitis, recurrence not specified  amoxicillin-clavulanate (AUGMENTIN) 875-125 mg per tablet            Patient Instructions     Take the antibiotics as ordered twice daily until completion  If your symptoms worsen you will need to be rechecked  Follow up with pcp in about 5 days for a recheck if you are not getting better  Follow up with PCP in 3-5 days.  Proceed to  ER if symptoms worsen.    If tests are performed, our office will contact you with results only if changes need to made to the care plan discussed with you at the visit. [facility]    Chief Complaint     Chief Complaint   Patient presents with    Cold Like Symptoms     Nasal drainage was clear, then yellow and now green.  Started 4 days ago         History of Present Illness       78-year-old female with past medical history of diabetes, diverticulitis, GERD, hypertension presents with complaint of nasal drainage that started 4 days ago.  Initially the drainage was clear but has now turned yellow and green.      Review of Systems   Review of Systems   HENT:  Positive for congestion and sinus pressure.          Current Medications       Current Outpatient Medications:     amoxicillin-clavulanate (AUGMENTIN) 875-125 mg per tablet, Take 1 tablet by mouth every 12 (twelve) hours for 5 days, Disp: 10 tablet, Rfl: 0    Ascorbic Acid (vitamin C) 100 MG tablet, Take 100 mg by mouth daily, Disp: , Rfl:     aspirin (ECOTRIN LOW STRENGTH) 81 mg EC tablet, Take 81 mg by mouth daily, Disp: , Rfl:     Blood Glucose Monitoring Suppl (FreeStyle Lite) w/Device KIT, USE AS DIRECTED E11.9, Disp: , Rfl:     Cholecalciferol 25 MCG (1000 UT) capsule, Take 5,000 Units by mouth daily, Disp: , Rfl:      esomeprazole (NexIUM) 40 MG capsule, Take 40 mg by mouth if needed, Disp: , Rfl:     FREESTYLE LITE test strip, USE 1 STRIP TO CHECK GLUCOSE ONCE DAILY, Disp: , Rfl:     ibuprofen (MOTRIN) 200 mg tablet, Take 200 mg by mouth every 6 (six) hours as needed, Disp: , Rfl:     ketorolac (ACULAR) 0.5 % ophthalmic solution, Administer 1 drop to both eyes 4 (four) times a day, Disp: , Rfl:     meloxicam (MOBIC) 15 mg tablet, Take 15 mg by mouth if needed, Disp: , Rfl:     metFORMIN (GLUCOPHAGE-XR) 500 mg 24 hr tablet, Take 500 mg by mouth daily with breakfast, Disp: , Rfl:     metoprolol succinate (TOPROL-XL) 50 mg 24 hr tablet, Take 50 mg by mouth daily, Disp: , Rfl:     Multiple Vitamins-Minerals (CENTRUM SILVER PO), Take by mouth, Disp: , Rfl:     olmesartan-hydrochlorothiazide (BENICAR HCT) 20-12.5 MG per tablet, Take 1 tablet by mouth daily, Disp: , Rfl:     potassium citrate (UROCIT-K) 10 mEq, TAKE 2 TABLETS (20 MEQ TOTAL) BY MOUTH 2 (TWO) TIMES A DAY., Disp: , Rfl:     CALCIUM MAGNESIUM 750 PO, Take by mouth (Patient not taking: Reported on 6/25/2024), Disp: , Rfl:     citric acid-potassium citrate (POLYCITRA K) 1,100-334 mg/5 mL solution, Take by mouth 3 (three) times a day with meals (Patient not taking: Reported on 6/25/2024), Disp: , Rfl:     fluticasone (FLONASE) 50 mcg/act nasal spray, 1 spray into each nostril daily (Patient not taking: Reported on 6/25/2024), Disp: 16 g, Rfl: 0    metoprolol tartrate (LOPRESSOR) 50 mg tablet, , Disp: , Rfl:     Current Allergies     Allergies as of 10/12/2024 - Reviewed 10/12/2024   Allergen Reaction Noted    Codeine Rash 04/16/2023            The following portions of the patient's history were reviewed and updated as appropriate: allergies, current medications, past family history, past medical history, past social history, past surgical history and problem list.     Past Medical History:   Diagnosis Date    Diabetes mellitus (HCC)     Diverticulitis     GERD  (gastroesophageal reflux disease)     Hypertension        Past Surgical History:   Procedure Laterality Date    CHOLECYSTECTOMY      TONSILLECTOMY         History reviewed. No pertinent family history.      Medications have been verified.        Objective   /75   Pulse 78   Temp 97.9 °F (36.6 °C)   Resp 18   SpO2 98%        Physical Exam     Physical Exam  Vitals and nursing note reviewed.   Constitutional:       General: She is not in acute distress.     Appearance: Normal appearance. She is obese. She is not ill-appearing.   HENT:      Head: Normocephalic and atraumatic.      Right Ear: Tympanic membrane, ear canal and external ear normal.      Left Ear: Tympanic membrane, ear canal and external ear normal.      Nose: Congestion present.      Right Sinus: Maxillary sinus tenderness and frontal sinus tenderness present.      Left Sinus: No maxillary sinus tenderness or frontal sinus tenderness.      Mouth/Throat:      Lips: Pink.      Mouth: Mucous membranes are moist.      Pharynx: Oropharynx is clear. Uvula midline.   Eyes:      Extraocular Movements: Extraocular movements intact.      Conjunctiva/sclera: Conjunctivae normal.      Pupils: Pupils are equal, round, and reactive to light.   Cardiovascular:      Rate and Rhythm: Normal rate and regular rhythm.      Pulses: Normal pulses.      Heart sounds: Normal heart sounds.   Pulmonary:      Effort: Pulmonary effort is normal.      Breath sounds: Normal breath sounds.   Musculoskeletal:      Cervical back: Normal range of motion and neck supple. No rigidity.   Lymphadenopathy:      Cervical: No cervical adenopathy.   Skin:     General: Skin is warm and dry.      Capillary Refill: Capillary refill takes less than 2 seconds.   Neurological:      General: No focal deficit present.      Mental Status: She is alert and oriented to person, place, and time.

## 2024-10-12 NOTE — PATIENT INSTRUCTIONS
Take the antibiotics as ordered twice daily until completion  If your symptoms worsen you will need to be rechecked  Follow up with pcp in about 5 days for a recheck if you are not getting better

## 2024-11-18 ENCOUNTER — OFFICE VISIT (OUTPATIENT)
Dept: URGENT CARE | Facility: CLINIC | Age: 79
End: 2024-11-18
Payer: MEDICARE

## 2024-11-18 VITALS
DIASTOLIC BLOOD PRESSURE: 80 MMHG | RESPIRATION RATE: 18 BRPM | HEART RATE: 74 BPM | OXYGEN SATURATION: 97 % | TEMPERATURE: 97.7 F | SYSTOLIC BLOOD PRESSURE: 182 MMHG

## 2024-11-18 DIAGNOSIS — L03.116 CELLULITIS OF LEFT ANTERIOR LOWER LEG: Primary | ICD-10-CM

## 2024-11-18 PROCEDURE — G0463 HOSPITAL OUTPT CLINIC VISIT: HCPCS | Performed by: FAMILY MEDICINE

## 2024-11-18 PROCEDURE — 99213 OFFICE O/P EST LOW 20 MIN: CPT | Performed by: FAMILY MEDICINE

## 2024-11-18 RX ORDER — CEPHALEXIN 500 MG/1
500 CAPSULE ORAL EVERY 8 HOURS SCHEDULED
Qty: 21 CAPSULE | Refills: 0 | Status: SHIPPED | OUTPATIENT
Start: 2024-11-18 | End: 2024-11-25

## 2024-11-18 NOTE — PROGRESS NOTES
"  [facility]        NAME: Kim Kwon is a 78 y.o. female  : 1945    MRN: [id number]  DATE: 2024  TIME: 12:01 PM    Assessment and Plan   No primary diagnosis found.  No diagnosis found.      Patient Instructions       Follow up with PCP in 3-5 days.  Proceed to  ER if symptoms worsen.    If tests are performed, our office will contact you with results only if changes need to made to the care plan discussed with you at the visit. [facility]    Chief Complaint     Chief Complaint   Patient presents with   • Wound Check     APPROx 1/2 IN ALEX BLACK SCABBED WOUND TO LATERAL LEFT DISTAL LOWER LEG \"I scraped in working in yard 1 month ago         History of Present Illness       78-year-old female patient with past medical history significant for diabetes, diverticulitis, GERD, hypertension presents to this clinic with complaint of a wound to the left lower leg.  She reports about 1 month ago she scraped her leg while working in the yard.  She is here to have the area checked.    Wound Check      Review of Systems   Review of Systems   Skin:  Positive for wound.       Current Medications       Current Outpatient Medications:   •  Ascorbic Acid (vitamin C) 100 MG tablet, Take 100 mg by mouth daily, Disp: , Rfl:   •  aspirin (ECOTRIN LOW STRENGTH) 81 mg EC tablet, Take 81 mg by mouth daily, Disp: , Rfl:   •  Blood Glucose Monitoring Suppl (FreeStyle Lite) w/Device KIT, USE AS DIRECTED E11.9, Disp: , Rfl:   •  CALCIUM MAGNESIUM 750 PO, Take by mouth (Patient not taking: Reported on 2024), Disp: , Rfl:   •  Cholecalciferol 25 MCG (1000 UT) capsule, Take 5,000 Units by mouth daily, Disp: , Rfl:   •  citric acid-potassium citrate (POLYCITRA K) 1,100-334 mg/5 mL solution, Take by mouth 3 (three) times a day with meals (Patient not taking: Reported on 2024), Disp: , Rfl:   •  esomeprazole (NexIUM) 40 MG capsule, Take 40 mg by mouth if needed, " Disp: , Rfl:   •  fluticasone (FLONASE) 50 mcg/act nasal spray, 1 spray into each nostril daily (Patient not taking: Reported on 6/25/2024), Disp: 16 g, Rfl: 0  •  FREESTYLE LITE test strip, USE 1 STRIP TO CHECK GLUCOSE ONCE DAILY, Disp: , Rfl:   •  ibuprofen (MOTRIN) 200 mg tablet, Take 200 mg by mouth every 6 (six) hours as needed, Disp: , Rfl:   •  ketorolac (ACULAR) 0.5 % ophthalmic solution, Administer 1 drop to both eyes 4 (four) times a day, Disp: , Rfl:   •  meloxicam (MOBIC) 15 mg tablet, Take 15 mg by mouth if needed, Disp: , Rfl:   •  metFORMIN (GLUCOPHAGE-XR) 500 mg 24 hr tablet, Take 500 mg by mouth daily with breakfast, Disp: , Rfl:   •  metoprolol succinate (TOPROL-XL) 50 mg 24 hr tablet, Take 50 mg by mouth daily, Disp: , Rfl:   •  metoprolol tartrate (LOPRESSOR) 50 mg tablet, , Disp: , Rfl:   •  Multiple Vitamins-Minerals (CENTRUM SILVER PO), Take by mouth, Disp: , Rfl:   •  olmesartan-hydrochlorothiazide (BENICAR HCT) 20-12.5 MG per tablet, Take 1 tablet by mouth daily, Disp: , Rfl:   •  potassium citrate (UROCIT-K) 10 mEq, TAKE 2 TABLETS (20 MEQ TOTAL) BY MOUTH 2 (TWO) TIMES A DAY., Disp: , Rfl:     Current Allergies     Allergies as of 11/18/2024 - Reviewed 11/18/2024   Allergen Reaction Noted   • Codeine Rash 04/16/2023            The following portions of the patient's history were reviewed and updated as appropriate: allergies, current medications, past family history, past medical history, past social history, past surgical history and problem list.     Past Medical History:   Diagnosis Date   • Diabetes mellitus (HCC)    • Diverticulitis    • GERD (gastroesophageal reflux disease)    • Hypertension        Past Surgical History:   Procedure Laterality Date   • CHOLECYSTECTOMY     • TONSILLECTOMY         No family history on file.      Medications have been verified.        Objective   BP (!) 182/80   Pulse 74   Temp 97.7 °F (36.5 °C)   Resp 18   SpO2 97%        Physical Exam     Physical  Exam

## 2024-11-18 NOTE — PROGRESS NOTES
"  [facility]        NAME: Kim Kwon is a 78 y.o. female  : 1945    MRN: [id number]  DATE: 2024  TIME: 12:06 PM    Assessment and Plan   Cellulitis of left anterior lower leg [L03.116]  1. Cellulitis of left anterior lower leg  cephalexin (KEFLEX) 500 mg capsule            Patient Instructions     Continue to monitor.  It would be best to take a picture of the wound every few days and presented to your family doctor when you see him in the next couple weeks.    Follow up with PCP in 3-5 days.  Proceed to  ER if symptoms worsen.    If tests have been performed at [facility], our office will contact you with results if changes need to be made to the care plan discussed with you at the visit.  [facility]    Chief Complaint     Chief Complaint   Patient presents with   • Wound Check     APPROx 1/2 IN ALEX BLACK SCABBED WOUND TO LATERAL LEFT DISTAL LOWER LEG \"I scraped in working in yard 1 month ago         History of Present Illness       Wound Check (APPROx 1/2 IN ALEX BLACK SCABBED WOUND TO LATERAL LEFT DISTAL LOWER LEG \"I scraped in working in yard 1 month ago)  Patient is a diabetic.  She states that her sugars have been controlled.  Patient states that she was not really concerned until it started to get red around the scab approximately 4 days ago.    Wound Check      Review of Systems   Review of Systems   Constitutional: Negative.    Skin:  Positive for wound.       Current Medications       Current Outpatient Medications:   •  cephalexin (KEFLEX) 500 mg capsule, Take 1 capsule (500 mg total) by mouth every 8 (eight) hours for 7 days, Disp: 21 capsule, Rfl: 0  •  Ascorbic Acid (vitamin C) 100 MG tablet, Take 100 mg by mouth daily, Disp: , Rfl:   •  aspirin (ECOTRIN LOW STRENGTH) 81 mg EC tablet, Take 81 mg by mouth daily, Disp: , Rfl:   •  Blood Glucose Monitoring Suppl (FreeStyle Lite) w/Device KIT, USE AS DIRECTED E11.9, Disp: , Rfl: "   •  CALCIUM MAGNESIUM 750 PO, Take by mouth (Patient not taking: Reported on 6/25/2024), Disp: , Rfl:   •  Cholecalciferol 25 MCG (1000 UT) capsule, Take 5,000 Units by mouth daily, Disp: , Rfl:   •  citric acid-potassium citrate (POLYCITRA K) 1,100-334 mg/5 mL solution, Take by mouth 3 (three) times a day with meals (Patient not taking: Reported on 6/25/2024), Disp: , Rfl:   •  esomeprazole (NexIUM) 40 MG capsule, Take 40 mg by mouth if needed, Disp: , Rfl:   •  fluticasone (FLONASE) 50 mcg/act nasal spray, 1 spray into each nostril daily (Patient not taking: Reported on 6/25/2024), Disp: 16 g, Rfl: 0  •  FREESTYLE LITE test strip, USE 1 STRIP TO CHECK GLUCOSE ONCE DAILY, Disp: , Rfl:   •  ibuprofen (MOTRIN) 200 mg tablet, Take 200 mg by mouth every 6 (six) hours as needed, Disp: , Rfl:   •  ketorolac (ACULAR) 0.5 % ophthalmic solution, Administer 1 drop to both eyes 4 (four) times a day, Disp: , Rfl:   •  meloxicam (MOBIC) 15 mg tablet, Take 15 mg by mouth if needed, Disp: , Rfl:   •  metFORMIN (GLUCOPHAGE-XR) 500 mg 24 hr tablet, Take 500 mg by mouth daily with breakfast, Disp: , Rfl:   •  metoprolol succinate (TOPROL-XL) 50 mg 24 hr tablet, Take 50 mg by mouth daily, Disp: , Rfl:   •  metoprolol tartrate (LOPRESSOR) 50 mg tablet, , Disp: , Rfl:   •  Multiple Vitamins-Minerals (CENTRUM SILVER PO), Take by mouth, Disp: , Rfl:   •  olmesartan-hydrochlorothiazide (BENICAR HCT) 20-12.5 MG per tablet, Take 1 tablet by mouth daily, Disp: , Rfl:   •  potassium citrate (UROCIT-K) 10 mEq, TAKE 2 TABLETS (20 MEQ TOTAL) BY MOUTH 2 (TWO) TIMES A DAY., Disp: , Rfl:     Current Allergies     Allergies as of 11/18/2024 - Reviewed 11/18/2024   Allergen Reaction Noted   • Codeine Rash 04/16/2023            The following portions of the patient's history were reviewed and updated as appropriate: allergies, current medications, past family history, past medical history, past social history, past surgical history and problem list.      Past Medical History:   Diagnosis Date   • Diabetes mellitus (HCC)    • Diverticulitis    • GERD (gastroesophageal reflux disease)    • Hypertension        Past Surgical History:   Procedure Laterality Date   • CHOLECYSTECTOMY     • TONSILLECTOMY         No family history on file.      Medications have been verified.        Objective   BP (!) 182/80   Pulse 74   Temp 97.7 °F (36.5 °C)   Resp 18   SpO2 97%   No LMP recorded.       Physical Exam     Physical Exam  Vitals and nursing note reviewed.   Constitutional:       Appearance: Normal appearance.   Cardiovascular:      Rate and Rhythm: Normal rate and regular rhythm.      Pulses: Normal pulses.   Pulmonary:      Effort: Pulmonary effort is normal.      Breath sounds: Normal breath sounds.   Skin:     Comments: Left anterior shin with a 1 x 1-1/2 cm scab.  2 to 2-1/2 cm area of erythema and induration around the scab.  No drainage   Neurological:      Mental Status: She is alert.

## 2024-11-18 NOTE — PATIENT INSTRUCTIONS
Continue to monitor.  It would be best to take a picture of the wound every few days and presented to your family doctor when you see him in the next couple weeks.    Follow up with PCP in 3-5 days.  Proceed to  ER if symptoms worsen.

## 2025-01-09 ENCOUNTER — OFFICE VISIT (OUTPATIENT)
Dept: WOUND CARE | Facility: CLINIC | Age: 80
End: 2025-01-09
Payer: MEDICARE

## 2025-01-09 VITALS
SYSTOLIC BLOOD PRESSURE: 120 MMHG | DIASTOLIC BLOOD PRESSURE: 78 MMHG | WEIGHT: 203 LBS | HEIGHT: 62 IN | TEMPERATURE: 97 F | RESPIRATION RATE: 18 BRPM | BODY MASS INDEX: 37.36 KG/M2 | HEART RATE: 78 BPM

## 2025-01-09 DIAGNOSIS — S81.802D TRAUMATIC OPEN WOUND OF LEFT LOWER LEG WITH DELAYED HEALING: Primary | ICD-10-CM

## 2025-01-09 PROCEDURE — 97597 DBRDMT OPN WND 1ST 20 CM/<: CPT | Performed by: PODIATRIST

## 2025-01-09 PROCEDURE — 99204 OFFICE O/P NEW MOD 45 MIN: CPT | Performed by: PODIATRIST

## 2025-01-09 PROCEDURE — 99213 OFFICE O/P EST LOW 20 MIN: CPT | Performed by: PODIATRIST

## 2025-01-09 RX ORDER — LIDOCAINE 40 MG/G
CREAM TOPICAL ONCE
Status: COMPLETED | OUTPATIENT
Start: 2025-01-09 | End: 2025-01-09

## 2025-01-09 RX ORDER — DOXYCYCLINE HYCLATE 100 MG
100 TABLET ORAL 2 TIMES DAILY
COMMUNITY
Start: 2025-01-07 | End: 2025-01-17

## 2025-01-09 RX ADMIN — LIDOCAINE: 40 CREAM TOPICAL at 15:11

## 2025-01-09 NOTE — PATIENT INSTRUCTIONS
Orders Placed This Encounter   Procedures    Wound cleansing and dressings Traumatic Left;Lower;Lateral Leg     Wash your hands with soap and water.  Remove old dressing, discard into plastic bag and place in trash.  Cleanse the wound with mild soap and water  prior to applying a clean dressing. Do not use tissue or cotton balls. Do not scrub the wound. Pat dry using gauze.  Shower no do not get dressings wet may cover with cast cover purchase at [facility] or [facility]     Apply sliver alginate  to the left leg wound.  Cover with gauze  Secure with orel and tape  Change dressing daily    Obtain vascular studies as ordered by MD    Please try to consume 3-4 servings of protein (30g) each day.   - Each serving of protein should contain about 30 mg protein.   - Good sources of protein include, lean meats (fish, chicken, etc), eggs, dairy products (yogurt, cheese), tofu, legumes (chickpeas, lentils, peas, black beans), nuts (cashew, walnut, peanuts, etc), & quinoa.      If you develop fever, chills, nausea or vomiting, increase in drainage or foul smelling drainage go to the closest emergency department for evaluation.      Follow up in 1 week     Standing Status:   Future     Expected Date:   1/16/2025     Expiration Date:   1/16/2025

## 2025-01-09 NOTE — PROGRESS NOTES
Patient ID: Kim Kwon is a 79 y.o. female Date of Birth 1945       Chief Complaint   Patient presents with    Follow Up Wound Care Visit    New Patient Visit     Wound left lower leg  patient stated she bumped it on a stone wall in October area would heal and scab over and the open she had several courses of antibiotics  referred by PCP       Allergies  Codeine    Diagnosis:  1. Traumatic open wound of left lower leg with delayed healing  -     lidocaine (LMX) 4 % cream  -     Wound cleansing and dressings Traumatic Left;Lower;Lateral Leg; Future; Expected date: 01/16/2025  -     Wound Procedure Treatment Traumatic Left;Lower;Lateral Leg  -     VAS ARTERIAL DUPLEX- LOWER LIMB BILATERAL; Future; Expected date: 01/09/2025      Diagnosis ICD-10-CM Associated Orders   1. Traumatic open wound of left lower leg with delayed healing  S81.802D lidocaine (LMX) 4 % cream     Wound cleansing and dressings Traumatic Left;Lower;Lateral Leg     Wound Procedure Treatment Traumatic Left;Lower;Lateral Leg     VAS ARTERIAL DUPLEX- LOWER LIMB BILATERAL             Assessment & Plan:  Left lateral traumatic wound    -DM2 A1c 6.6% 12/2024  -[facility] PCP notes reviewed, most recent 1/2/2025  -Chronic wound left anterior medial leg after traumatic injury sustained in October.  No official wound care performed, treated with antibiotics for multiple superficial infections  -Complete doxycycline 100 mg twice daily as prescribed by PCP  -Vas arterial studies ordered  -Patient counseled on importance of lower extremity elevation for decrease in edema  -Patient counseled on wound care and importance of evaluating possible underlying causes for delayed healing and nonhealing  -Patient counseled on importance of strict glycemic control and high-protein diet  -Selective debridement performed at today's evaluation.  Initiation of silver alginate wound dressings  -Will consider compression pending completion of antibiotic and lower extremity  vascular studies  -Follow-up 1 week        Subjective:   HPI  Patient presents for evaluation and management of left anterior leg traumatic wound delayed healing.  Injury was sustained in October and has been managed by her primary care physician.  She states the area will begin to heal and then declined.  She reports mild to moderate drainage, serosanguineous.  Patient reports intermittent pain in the area of wound with activity and while resting.  She denies any significant swelling or redness at the site of injury however has mild bilateral lower extremity swelling.  Patient initially was using nonstick dressing over the area after cleaning with warm soapy water.  Recently she was advised to leave the area open to air.  She had a slight increase in redness in the area and was prescribed doxycycline by her primary care physician on 1/7/2025 and referred to wound care for further evaluation and management.  Patient denies nausea, vomiting, fever, chills, shortness of breath      The following portions of the patient's history were reviewed and updated as appropriate:   There is no problem list on file for this patient.    Past Medical History:   Diagnosis Date    Diabetes mellitus (HCC)     Diverticulitis     GERD (gastroesophageal reflux disease)     Hypertension      Past Surgical History:   Procedure Laterality Date    CHOLECYSTECTOMY      TONSILLECTOMY       Social History     Socioeconomic History    Marital status:      Spouse name: Not on file    Number of children: Not on file    Years of education: Not on file    Highest education level: Not on file   Occupational History    Not on file   Tobacco Use    Smoking status: Never    Smokeless tobacco: Never   Vaping Use    Vaping status: Never Used   Substance and Sexual Activity    Alcohol use: Not on file    Drug use: Not on file    Sexual activity: Not on file   Other Topics Concern    Not on file   Social History Narrative    Not on file     Social  Drivers of Health     Financial Resource Strain: Low Risk  (8/23/2023)    Received from [facility]    Overall Financial Resource Strain (CARDIA)     Difficulty of Paying Living Expenses: Not hard at all   Food Insecurity: No Food Insecurity (8/23/2023)    Received from [facility]    Hunger Vital Sign     Worried About Running Out of Food in the Last Year: Never true     Ran Out of Food in the Last Year: Never true   Transportation Needs: No Transportation Needs (8/23/2023)    Received from [facility]    PRAPARE - Transportation     Lack of Transportation (Medical): No     Lack of Transportation (Non-Medical): No   Physical Activity: Not on file   Stress: No Stress Concern Present (8/23/2023)    Received from [facility]    [employer] - Occupational Stress Questionnaire     Feeling of Stress : Not at all   Social Connections: Unknown (6/18/2024)    Received from [employer]    Social Connections     How often do you feel lonely or isolated from those around you? (Adult - for ages 18 years and over): Not on file   Intimate Partner Violence: Not At Risk (8/23/2023)    Received from [facility]    Humiliation, Afraid, Rape, and Kick questionnaire     Fear of Current or Ex-Partner: No     Emotionally Abused: No     Physically Abused: No     Sexually Abused: No   Housing Stability: Not on file        Current Outpatient Medications:     Ascorbic Acid (vitamin C) 100 MG tablet, Take 100 mg by mouth daily, Disp: , Rfl:     aspirin (ECOTRIN LOW STRENGTH) 81 mg EC tablet, Take 81 mg by mouth daily, Disp: , Rfl:     Blood Glucose Monitoring Suppl (FreeStyle Lite) w/Device KIT, USE AS DIRECTED E11.9, Disp: , Rfl:     Cholecalciferol 25 MCG (1000 UT) capsule, Take 5,000 Units by mouth daily, Disp: , Rfl:     doxycycline hyclate (VIBRA-TABS) 100 mg tablet, Take 100 mg by mouth 2 (two) times a day, Disp: , Rfl:      "esomeprazole (NexIUM) 40 MG capsule, Take 40 mg by mouth if needed, Disp: , Rfl:     FREESTYLE LITE test strip, USE 1 STRIP TO CHECK GLUCOSE ONCE DAILY, Disp: , Rfl:     ibuprofen (MOTRIN) 200 mg tablet, Take 200 mg by mouth every 6 (six) hours as needed, Disp: , Rfl:     meloxicam (MOBIC) 15 mg tablet, Take 15 mg by mouth if needed, Disp: , Rfl:     metFORMIN (GLUCOPHAGE-XR) 500 mg 24 hr tablet, Take 500 mg by mouth daily with breakfast, Disp: , Rfl:     metoprolol succinate (TOPROL-XL) 50 mg 24 hr tablet, Take 50 mg by mouth daily, Disp: , Rfl:     Multiple Vitamins-Minerals (CENTRUM SILVER PO), Take by mouth, Disp: , Rfl:     olmesartan-hydrochlorothiazide (BENICAR HCT) 20-12.5 MG per tablet, Take 1 tablet by mouth daily, Disp: , Rfl:   No current facility-administered medications for this visit.  No family history on file.   Review of Systems   Constitutional:  Negative for chills and fever.   Respiratory:  Negative for chest tightness and shortness of breath.    Cardiovascular:  Positive for leg swelling.   Musculoskeletal:  Positive for arthralgias, back pain and gait problem.   Skin:  Positive for wound.       Objective:  /78   Pulse 78   Temp (!) 97 °F (36.1 °C)   Resp 18   Ht 5' 2\" (1.575 m)   Wt 92.1 kg (203 lb)   BMI 37.13 kg/m²     Physical Exam  Constitutional:       General: She is not in acute distress.     Appearance: She is not ill-appearing.   Cardiovascular:      Comments: Left DP pulse 1/4, PT pulse 0/4  CRT less than 3 seconds  Absent hair growth to distal digits  Mild nonpitting edema to bilateral lower extremities  Pulmonary:      Effort: No respiratory distress.   Musculoskeletal:      Comments: MMT 5/5 at level of ankle bilaterally  No pain with palpitation of posterior calves bilaterally   Skin:     Comments: Left anterior lateral leg traumatic wound with fibrogranular base.  Trace erythema in periwound area.  No significant localized edema.  Mild tenderness with direct " palpation.  No crepitus, no fluctuance, no malodor.  Mild serosanguineous drainage   Neurological:      Comments: Gross sensation intact to bilateral feet             Wound 01/09/25 Traumatic Leg Left;Lower;Lateral (Active)   Wound Image   01/09/25 1440   Wound Description Yellow;Slough 01/09/25 1441   Sabrina-wound Assessment Brown;Scaly;Dry 01/09/25 1441   Wound Length (cm) 1.5 cm 01/09/25 1441   Wound Width (cm) 0.8 cm 01/09/25 1441   Wound Depth (cm) 0.2 cm 01/09/25 1441   Wound Surface Area (cm^2) 1.2 cm^2 01/09/25 1441   Wound Volume (cm^3) 0.24 cm^3 01/09/25 1441   Calculated Wound Volume (cm^3) 0.24 cm^3 01/09/25 1441   Drainage Amount Small 01/09/25 1441   Drainage Description Serosanguineous 01/09/25 1441                             Debridement    Universal Protocol:  procedure performed by consultantConsent: Verbal consent obtained.  Risks and benefits: risks, benefits and alternatives were discussed  Consent given by: patient  Patient identity confirmed: verbally with patient    Debridement Details  Performed by: physician  Debridement type: selective  Pain control: lidocaine 4%      Post-debridement measurements  Length (cm): 1.5  Width (cm): 0.8  Depth (cm): 0.2  Percent debrided: 100%  Surface Area (cm^2): 1.2  Area Debrided (cm^2): 1.2  Volume (cm^3): 0.24    Devitalized tissue debrided: fibrin and slough  Instrument(s) utilized: curette  Technique utilized: nonexcisionalBleeding: small  Hemostasis obtained with: pressure  Procedural pain (0-10): 0  Post-procedural pain: 0   Response to treatment: procedure was tolerated well               Wound Instructions:  Orders Placed This Encounter   Procedures    Wound cleansing and dressings Traumatic Left;Lower;Lateral Leg     Wash your hands with soap and water.  Remove old dressing, discard into plastic bag and place in trash.  Cleanse the wound with mild soap and water  prior to applying a clean dressing. Do not use tissue or cotton balls. Do not scrub the  "wound. Pat dry using gauze.  Shower no do not get dressings wet may cover with cast cover purchase at [employer] or [email or web address]     Apply sliver alginate  to the left leg wound.  Cover with gauze  Secure with roel and tape  Change dressing daily    Obtain vascular studies as ordered by MD    Please try to consume 3-4 servings of protein (30g) each day.   - Each serving of protein should contain about 30 mg protein.   - Good sources of protein include, lean meats (fish, chicken, etc), eggs, dairy products (yogurt, cheese), tofu, legumes (chickpeas, lentils, peas, black beans), nuts (cashew, walnut, peanuts, etc), & quinoa.      If you develop fever, chills, nausea or vomiting, increase in drainage or foul smelling drainage go to the closest emergency department for evaluation.      Follow up in 1 week     Standing Status:   Future     Expected Date:   1/16/2025     Expiration Date:   1/16/2025    Wound Procedure Treatment Traumatic Left;Lower;Lateral Leg     This order was created via procedure documentation    Debridement     This order was created via procedure documentation         Jessica Azevedo DPM    Portions of the record may have been created with voice recognition software. Occasional wrong word or \"sound a like\" substitutions may have occurred due to the inherent limitations of voice recognition software. Read the chart carefully and recognize, using context, where substitutions have occurred.     "

## 2025-01-09 NOTE — PROGRESS NOTES
Wound Procedure Treatment Traumatic Left;Lower;Lateral Leg    Performed by: Antonia Conrad RN  Authorized by: Jessica Azevedo DPM    Associated wounds:   Wound 01/09/25 Traumatic Leg Left;Lower;Lateral  Wound cleansed with:  NSS  Applied primary dressing:  Silver and Calcium alginate  Applied secondary dressing:  Gauze  Dressing secured with:  Tu and Tape

## 2025-01-16 ENCOUNTER — OFFICE VISIT (OUTPATIENT)
Dept: WOUND CARE | Facility: CLINIC | Age: 80
End: 2025-01-16
Payer: MEDICARE

## 2025-01-16 VITALS
HEART RATE: 74 BPM | DIASTOLIC BLOOD PRESSURE: 80 MMHG | TEMPERATURE: 97.5 F | SYSTOLIC BLOOD PRESSURE: 126 MMHG | RESPIRATION RATE: 18 BRPM

## 2025-01-16 DIAGNOSIS — S81.802D TRAUMATIC OPEN WOUND OF LEFT LOWER LEG WITH DELAYED HEALING: Primary | ICD-10-CM

## 2025-01-16 PROCEDURE — 11042 DBRDMT SUBQ TIS 1ST 20SQCM/<: CPT | Performed by: PODIATRIST

## 2025-01-16 NOTE — PATIENT INSTRUCTIONS
Orders Placed This Encounter   Procedures    Wound cleansing and dressings Traumatic Left;Lower;Lateral Leg     Wound cleansing and dressings Traumatic Left;Lower;Lateral Leg      Wash your hands with soap and water.  Remove old dressing, discard into plastic bag and place in trash.  Cleanse the wound with mild soap and water  prior to applying a clean dressing. Do not use tissue or cotton balls. Do not scrub the wound. Pat dry using gauze.  Shower no do not get dressings wet may cover with cast cover purchase at [facility] or [facility]      Apply sliver alginate  to the left leg wound.  Cover with gauze  Secure with roel and tape  Change dressing daily       Please try to consume 3-4 servings of protein (30g) each day.   - Each serving of protein should contain about 30 mg protein.   - Good sources of protein include, lean meats (fish, chicken, etc), eggs, dairy products (yogurt, cheese), tofu, legumes (chickpeas, lentils, peas, black beans), nuts (cashew, walnut, peanuts, etc), & quinoa.       If you develop fever, chills, nausea or vomiting, increase in drainage or foul smelling drainage go to the closest emergency department for evaluation.       Follow up in 1 week     Standing Status:   Future     Expected Date:   1/23/2025     Expiration Date:   1/23/2025

## 2025-01-16 NOTE — PROGRESS NOTES
Patient ID: Kim Kwon is a 79 y.o. female Date of Birth 1945       Chief Complaint   Patient presents with    Follow Up Wound Care Visit     Wound LLE       Allergies:  Codeine    Diagnosis:  1. Traumatic open wound of left lower leg with delayed healing  -     Wound cleansing and dressings Traumatic Left;Lower;Lateral Leg; Future; Expected date: 01/23/2025  -     Wound Procedure Treatment Traumatic Left;Lower;Lateral Leg     Diagnosis ICD-10-CM Associated Orders   1. Traumatic open wound of left lower leg with delayed healing  S81.802D Wound cleansing and dressings Traumatic Left;Lower;Lateral Leg     Wound Procedure Treatment Traumatic Left;Lower;Lateral Leg           Assessment & Plan:  See wound orders.  (Silver alginate, DSD)    -Left leg lateral traumatic wound, fibrotic base at today's visit.  No SOI  - Surgical debridement of fibrotic and nonviable tissue at base of wound  -DM2 A1c 6.6% 12/2024  -[facility] PCP notes reviewed, most recent 1/2/2025  -Last dose of doxycycline prescribed by PCP completed today  -Vas arterial studies completed at [facility] yesterday, results pending  -Patient counseled on importance of lower extremity elevation for decrease in edema  -Patient counseled on wound care and importance of evaluating possible underlying causes for delayed healing and nonhealing  -Patient counseled on importance of strict glycemic control and high-protein diet  -Will evaluate vascular results and plan to initiate compression if no contraindication  -Patient counseled on clinical signs of infection present to the ED if these occur  -Follow-up 1 week    Subjective:   1/16/2025 patient presents today for further evaluation and management of left anterior lateral leg traumatic wound sustained in October with delayed healing.  Patient has been adherent to wound care.  She denies any significant changes in wound.  Today she completes her oral doxycycline that was prescribed by her PCP.  Denies any clinical  signs of infection.  Patient states she had her vascular test performed yesterday afternoon however she has not received the results and I have not received results.  Patient denies nausea, vomiting, fever, chills, shortness of breath      1/9/2025 Patient presents for evaluation and management of left anterior leg traumatic wound delayed healing.  Injury was sustained in October and has been managed by her primary care physician.  She states the area will begin to heal and then declined.  She reports mild to moderate drainage, serosanguineous.  Patient reports intermittent pain in the area of wound with activity and while resting.  She denies any significant swelling or redness at the site of injury however has mild bilateral lower extremity swelling.  Patient initially was using nonstick dressing over the area after cleaning with warm soapy water.  Recently she was advised to leave the area open to air.  She had a slight increase in redness in the area and was prescribed doxycycline by her primary care physician on 1/7/2025 and referred to wound care for further evaluation and management.  Patient denies nausea, vomiting, fever, chills, shortness of breath        The following portions of the patient's history were reviewed and updated as appropriate:   There is no problem list on file for this patient.    Past Medical History:   Diagnosis Date    Diabetes mellitus (HCC)     Diverticulitis     GERD (gastroesophageal reflux disease)     Hypertension      Past Surgical History:   Procedure Laterality Date    CHOLECYSTECTOMY      TONSILLECTOMY       Social History     Socioeconomic History    Marital status:      Spouse name: Not on file    Number of children: Not on file    Years of education: Not on file    Highest education level: Not on file   Occupational History    Not on file   Tobacco Use    Smoking status: Never    Smokeless tobacco: Never   Vaping Use    Vaping status: Never Used   Substance and  Sexual Activity    Alcohol use: Not on file    Drug use: Not on file    Sexual activity: Not on file   Other Topics Concern    Not on file   Social History Narrative    Not on file     Social Drivers of Health     Financial Resource Strain: Low Risk  (8/23/2023)    Received from [facility]    Overall Financial Resource Strain (CARDIA)     Difficulty of Paying Living Expenses: Not hard at all   Food Insecurity: No Food Insecurity (8/23/2023)    Received from [facility]    Hunger Vital Sign     Worried About Running Out of Food in the Last Year: Never true     Ran Out of Food in the Last Year: Never true   Transportation Needs: No Transportation Needs (8/23/2023)    Received from [facility]    PRAPARE - Transportation     Lack of Transportation (Medical): No     Lack of Transportation (Non-Medical): No   Physical Activity: Not on file   Stress: No Stress Concern Present (8/23/2023)    Received from [facility]    [employer] - Occupational Stress Questionnaire     Feeling of Stress : Not at all   Social Connections: Unknown (6/18/2024)    Received from [email or web address]    Social Connections     How often do you feel lonely or isolated from those around you? (Adult - for ages 18 years and over): Not on file   Intimate Partner Violence: Not At Risk (8/23/2023)    Received from [facility]    Humiliation, Afraid, Rape, and Kick questionnaire     Fear of Current or Ex-Partner: No     Emotionally Abused: No     Physically Abused: No     Sexually Abused: No   Housing Stability: Not on file        Current Outpatient Medications:     Ascorbic Acid (vitamin C) 100 MG tablet, Take 100 mg by mouth daily, Disp: , Rfl:     aspirin (ECOTRIN LOW STRENGTH) 81 mg EC tablet, Take 81 mg by mouth daily, Disp: , Rfl:     Blood Glucose Monitoring Suppl (FreeStyle Lite) w/Device KIT, USE AS DIRECTED E11.9, Disp: , Rfl:      Cholecalciferol 25 MCG (1000 UT) capsule, Take 5,000 Units by mouth daily, Disp: , Rfl:     doxycycline hyclate (VIBRA-TABS) 100 mg tablet, Take 100 mg by mouth 2 (two) times a day, Disp: , Rfl:     esomeprazole (NexIUM) 40 MG capsule, Take 40 mg by mouth if needed, Disp: , Rfl:     FREESTYLE LITE test strip, USE 1 STRIP TO CHECK GLUCOSE ONCE DAILY, Disp: , Rfl:     ibuprofen (MOTRIN) 200 mg tablet, Take 200 mg by mouth every 6 (six) hours as needed, Disp: , Rfl:     meloxicam (MOBIC) 15 mg tablet, Take 15 mg by mouth if needed, Disp: , Rfl:     metFORMIN (GLUCOPHAGE-XR) 500 mg 24 hr tablet, Take 500 mg by mouth daily with breakfast, Disp: , Rfl:     metoprolol succinate (TOPROL-XL) 50 mg 24 hr tablet, Take 50 mg by mouth daily, Disp: , Rfl:     Multiple Vitamins-Minerals (CENTRUM SILVER PO), Take by mouth, Disp: , Rfl:     olmesartan-hydrochlorothiazide (BENICAR HCT) 20-12.5 MG per tablet, Take 1 tablet by mouth daily, Disp: , Rfl:   No family history on file.   Review of Systems    Constitutional:  Negative for chills and fever.   Respiratory:  Negative for chest tightness and shortness of breath.    Cardiovascular:  Positive for leg swelling.   Musculoskeletal:  Positive for arthralgias, back pain and gait problem.   Skin:  Positive for wound.       Objective:  /80   Pulse 74   Temp 97.5 °F (36.4 °C)   Resp 18     Physical Exam    Constitutional:       General: She is not in acute distress.     Appearance: She is not ill-appearing.   Cardiovascular:      Comments: Left DP pulse 1/4, PT pulse 0/4  CRT less than 3 seconds  Absent hair growth to distal digits  Mild nonpitting edema to bilateral lower extremities  Pulmonary:      Effort: No respiratory distress.   Musculoskeletal:      Comments: MMT 5/5 at level of ankle bilaterally  No pain with palpitation of posterior calves bilaterally   Skin:     Comments: Left anterior lateral leg traumatic wound with fibrogranular base.  Trace erythema in periwound  area.  No significant localized edema.  Mild tenderness with direct palpation.  No crepitus, no fluctuance, no malodor.  Mild serosanguineous drainage   Neurological:      Comments: Gross sensation intact to bilateral feet     Wound 01/09/25 Traumatic Leg Left;Lower;Lateral (Active)   Wound Image   01/16/25 1549   Wound Description Yellow;Slough 01/16/25 1551   Sabrina-wound Assessment Scar Tissue;Pink 01/16/25 1551   Wound Length (cm) 2 cm 01/16/25 1551   Wound Width (cm) 1.2 cm 01/16/25 1551   Wound Depth (cm) 0.2 cm 01/16/25 1551   Wound Surface Area (cm^2) 2.4 cm^2 01/16/25 1551   Wound Volume (cm^3) 0.48 cm^3 01/16/25 1551   Calculated Wound Volume (cm^3) 0.48 cm^3 01/16/25 1551   Change in Wound Size % -100 01/16/25 1551   Drainage Amount Moderate 01/16/25 1551   Drainage Description Serosanguineous 01/16/25 1551                         Debridement    Universal Protocol:  procedure performed by consultantConsent: Verbal consent obtained.  Risks and benefits: risks, benefits and alternatives were discussed  Consent given by: patient  Patient understanding: patient states understanding of the procedure being performed  Patient identity confirmed: verbally with patient    Debridement Details  Performed by: physician  Debridement type: surgical  Level of debridement: subcutaneous tissue  Pain control: lidocaine 4%      Post-debridement measurements  Length (cm): 2  Width (cm): 1.2  Depth (cm): 0.2  Percent debrided: 100%  Surface Area (cm^2): 2.4  Area Debrided (cm^2): 2.4  Volume (cm^3): 0.48    Devitalized tissue debrided: fibrin and slough  Instrument(s) utilized: curette  Technique utilized: excisionalBleeding: medium  Hemostasis obtained with: pressure  Procedural pain (0-10): 1  Post-procedural pain: 0                  Wound Instructions:  Orders Placed This Encounter   Procedures    Wound cleansing and dressings Traumatic Left;Lower;Lateral Leg     Wound cleansing and dressings Traumatic Left;Lower;Lateral  "Leg      Wash your hands with soap and water.  Remove old dressing, discard into plastic bag and place in trash.  Cleanse the wound with mild soap and water  prior to applying a clean dressing. Do not use tissue or cotton balls. Do not scrub the wound. Pat dry using gauze.  Shower no do not get dressings wet may cover with cast cover purchase at [facility] or [facility]      Apply sliver alginate  to the left leg wound.  Cover with gauze  Secure with roel and tape  Change dressing daily       Please try to consume 3-4 servings of protein (30g) each day.   - Each serving of protein should contain about 30 mg protein.   - Good sources of protein include, lean meats (fish, chicken, etc), eggs, dairy products (yogurt, cheese), tofu, legumes (chickpeas, lentils, peas, black beans), nuts (cashew, walnut, peanuts, etc), & quinoa.       If you develop fever, chills, nausea or vomiting, increase in drainage or foul smelling drainage go to the closest emergency department for evaluation.       Follow up in 1 week     Standing Status:   Future     Expected Date:   1/23/2025     Expiration Date:   1/23/2025    Debridement     This order was created via procedure documentation    Wound Procedure Treatment Traumatic Left;Lower;Lateral Leg     This order was created via procedure documentation         Jessica Azevedo DPM      Portions of the record may have been created with voice recognition software. Occasional wrong word or \"sound a like\" substitutions may have occurred due to the inherent limitations of voice recognition software. Read the chart carefully and recognize, using context, where substitutions have occurred.      "

## 2025-01-23 ENCOUNTER — OFFICE VISIT (OUTPATIENT)
Dept: WOUND CARE | Facility: CLINIC | Age: 80
End: 2025-01-23
Payer: MEDICARE

## 2025-01-23 VITALS
TEMPERATURE: 96.7 F | DIASTOLIC BLOOD PRESSURE: 82 MMHG | SYSTOLIC BLOOD PRESSURE: 142 MMHG | HEART RATE: 80 BPM | RESPIRATION RATE: 18 BRPM

## 2025-01-23 DIAGNOSIS — S81.802D TRAUMATIC OPEN WOUND OF LEFT LOWER LEG WITH DELAYED HEALING: Primary | ICD-10-CM

## 2025-01-23 PROCEDURE — 97597 DBRDMT OPN WND 1ST 20 CM/<: CPT | Performed by: PODIATRIST

## 2025-01-23 RX ORDER — LIDOCAINE 40 MG/G
CREAM TOPICAL ONCE
Status: COMPLETED | OUTPATIENT
Start: 2025-01-23 | End: 2025-01-23

## 2025-01-23 RX ADMIN — LIDOCAINE: 40 CREAM TOPICAL at 14:02

## 2025-01-23 NOTE — PROGRESS NOTES
Wound Procedure Treatment Traumatic Left;Lower;Lateral Leg    Performed by: Shae Espinal RN  Authorized by: Jessica Azevedo DPM    Associated wounds:   Wound 01/09/25 Traumatic Leg Left;Lower;Lateral  Wound cleansed with:  NSS  Applied primary dressing:  Silver and Calcium alginate  Applied secondary dressing:  Gauze  Dressing secured with:  Tu, Tape, Elastic tubular stocking and Size F

## 2025-01-23 NOTE — PROGRESS NOTES
Patient ID: Kim Kwon is a 79 y.o. female Date of Birth 1945       Chief Complaint   Patient presents with    Follow Up Wound Care Visit     LLE wound        Allergies:  Codeine    Diagnosis:  1. Traumatic open wound of left lower leg with delayed healing  -     Wound cleansing and dressings Traumatic Left;Lower;Lateral Leg; Future; Expected date: 01/30/2025  -     lidocaine (LMX) 4 % cream     Diagnosis ICD-10-CM Associated Orders   1. Traumatic open wound of left lower leg with delayed healing  S81.802D Wound cleansing and dressings Traumatic Left;Lower;Lateral Leg     lidocaine (LMX) 4 % cream           Assessment & Plan:  See wound orders.  (Silver alginate, DSD)     -Left leg lateral traumatic wound, decrease fibrotic tissue and slight decrease in wound size.  No SOI  -Selective debridement performed  -DM2 A1c 6.6% 12/2024  -Patient without clinical signs of infection after completion of doxycycline  -Vas arterial studies completed at [facility]:No hemodynamically significant right or left lower extremity arterial disease.   RIGHT Biphasic flow right common femoral artery. Biphasic flow profunda femoris   artery. Biphasic flow throughout the superficial femoral artery. Biphasic flow   popliteal artery. Biphasic flow distal posterior tibial artery. Biphasic flow   dorsalis pedis.   LEFT there is biphasic flow in the external iliac artery. There is   biphasic to triphasic flow common femoral artery. Biphasic flow profunda femoris   artery. Biphasic flow is noted throughout the superficial femoral artery.   Biphasic flow popliteal artery. Biphasic flow distal posterior tibial artery.   Biphasic flow dorsalis pedis.   -Patient counseled on vascular study results  -Patient counseled on importance of lower extremity elevation for decrease in edema  -We will initiate use of Spendagrip compression to help with lower extremity edema  -Patient counseled on importance of strict glycemic control and high-protein  diet  -Patient counseled on clinical signs of infection present to the ED if these occur  -Follow-up 2 week    Subjective:   HPI  1/23/2025 patient presents today for further evaluation and management of left anterior leg traumatic wound sustained in October with delayed healing.  Patient has been adherent to wound care.  She reports last yellow slough on wounds during dressing changes.  She denies any increase in redness, swelling, drainage.  She denies any systemic signs of infection such as nausea, vomiting, fever, chills, shortness of breath    1/16/2025 patient presents today for further evaluation and management of left anterior lateral leg traumatic wound sustained in October with delayed healing.  Patient has been adherent to wound care.  She denies any significant changes in wound.  Today she completes her oral doxycycline that was prescribed by her PCP.  Denies any clinical signs of infection.  Patient states she had her vascular test performed yesterday afternoon however she has not received the results and I have not received results.  Patient denies nausea, vomiting, fever, chills, shortness of breath        1/9/2025 Patient presents for evaluation and management of left anterior leg traumatic wound delayed healing.  Injury was sustained in October and has been managed by her primary care physician.  She states the area will begin to heal and then declined.  She reports mild to moderate drainage, serosanguineous.  Patient reports intermittent pain in the area of wound with activity and while resting.  She denies any significant swelling or redness at the site of injury however has mild bilateral lower extremity swelling.  Patient initially was using nonstick dressing over the area after cleaning with warm soapy water.  Recently she was advised to leave the area open to air.  She had a slight increase in redness in the area and was prescribed doxycycline by her primary care physician on 1/7/2025 and  referred to wound care for further evaluation and management.  Patient denies nausea, vomiting, fever, chills, shortness of breath  The following portions of the patient's history were reviewed and updated as appropriate:   There is no problem list on file for this patient.    Past Medical History:   Diagnosis Date    Diabetes mellitus (HCC)     Diverticulitis     GERD (gastroesophageal reflux disease)     Hypertension      Past Surgical History:   Procedure Laterality Date    CHOLECYSTECTOMY      TONSILLECTOMY       Social History     Socioeconomic History    Marital status:      Spouse name: Not on file    Number of children: Not on file    Years of education: Not on file    Highest education level: Not on file   Occupational History    Not on file   Tobacco Use    Smoking status: Never    Smokeless tobacco: Never   Vaping Use    Vaping status: Never Used   Substance and Sexual Activity    Alcohol use: Not on file    Drug use: Not on file    Sexual activity: Not on file   Other Topics Concern    Not on file   Social History Narrative    Not on file     Social Drivers of Health     Financial Resource Strain: Low Risk  (8/23/2023)    Received from [facility]    Overall Financial Resource Strain (CARDIA)     Difficulty of Paying Living Expenses: Not hard at all   Food Insecurity: No Food Insecurity (8/23/2023)    Received from [facility]    Hunger Vital Sign     Worried About Running Out of Food in the Last Year: Never true     Ran Out of Food in the Last Year: Never true   Transportation Needs: No Transportation Needs (8/23/2023)    Received from [facility]    PRAPARE - Transportation     Lack of Transportation (Medical): No     Lack of Transportation (Non-Medical): No   Physical Activity: Not on file   Stress: No Stress Concern Present (8/23/2023)    Received from [facility]    [employer] [place] [employer] - Occupational  Stress Questionnaire     Feeling of Stress : Not at all   Social Connections: Unknown (6/18/2024)    Received from [facility]     How often do you feel lonely or isolated from those around you? (Adult - for ages 18 years and over): Not on file   Intimate Partner Violence: Not At Risk (8/23/2023)    Received from [facility]    Humiliation, Afraid, Rape, and Kick questionnaire     Fear of Current or Ex-Partner: No     Emotionally Abused: No     Physically Abused: No     Sexually Abused: No   Housing Stability: Not on file        Current Outpatient Medications:     Ascorbic Acid (vitamin C) 100 MG tablet, Take 100 mg by mouth daily, Disp: , Rfl:     aspirin (ECOTRIN LOW STRENGTH) 81 mg EC tablet, Take 81 mg by mouth daily, Disp: , Rfl:     Blood Glucose Monitoring Suppl (FreeStyle Lite) w/Device KIT, USE AS DIRECTED E11.9, Disp: , Rfl:     Cholecalciferol 25 MCG (1000 UT) capsule, Take 5,000 Units by mouth daily, Disp: , Rfl:     esomeprazole (NexIUM) 40 MG capsule, Take 40 mg by mouth if needed, Disp: , Rfl:     FREESTYLE LITE test strip, USE 1 STRIP TO CHECK GLUCOSE ONCE DAILY, Disp: , Rfl:     ibuprofen (MOTRIN) 200 mg tablet, Take 200 mg by mouth every 6 (six) hours as needed, Disp: , Rfl:     meloxicam (MOBIC) 15 mg tablet, Take 15 mg by mouth if needed, Disp: , Rfl:     metFORMIN (GLUCOPHAGE-XR) 500 mg 24 hr tablet, Take 500 mg by mouth daily with breakfast, Disp: , Rfl:     metoprolol succinate (TOPROL-XL) 50 mg 24 hr tablet, Take 50 mg by mouth daily, Disp: , Rfl:     Multiple Vitamins-Minerals (CENTRUM SILVER PO), Take by mouth, Disp: , Rfl:     olmesartan-hydrochlorothiazide (BENICAR HCT) 20-12.5 MG per tablet, Take 1 tablet by mouth daily, Disp: , Rfl:   No current facility-administered medications for this visit.  No family history on file.   Review of Systems  Constitutional:  Negative for chills and fever.   Respiratory:  Negative for chest tightness and shortness of  breath.    Cardiovascular:  Positive for leg swelling.   Musculoskeletal:  Positive for arthralgias, back pain and gait problem.   Skin:  Positive for wound.        Objective:  /82   Pulse 80   Temp (!) 96.7 °F (35.9 °C)   Resp 18     Physical Exam    Constitutional:       General: She is not in acute distress.     Appearance: She is not ill-appearing.   Cardiovascular:      Comments: Left DP pulse 1/4, PT pulse 0/4  CRT less than 3 seconds  Absent hair growth to distal digits  Mild nonpitting edema to bilateral lower extremities  Pulmonary:      Effort: No respiratory distress.   Musculoskeletal:      Comments: MMT 5/5 at level of ankle bilaterally  No pain with palpitation of posterior calves bilaterally   Skin:     Comments: Left anterior lateral leg traumatic wound with fibrogranular base.  Less fibrotic tissue.  Trace erythema in periwound area, improved since last exam.  No significant localized edema.  Mild tenderness with direct palpation.  No crepitus, no fluctuance, no malodor.  Mild serosanguineous drainage   Neurological:      Comments: Gross sensation intact to bilateral feet       Wound 01/09/25 Traumatic Leg Left;Lower;Lateral (Active)   Wound Image   01/23/25 1345   Wound Description Yellow;Slough 01/23/25 1355   Sabrina-wound Assessment Scar Tissue;[place] 01/23/25 1355   Wound Length (cm) 1.7 cm 01/23/25 1355   Wound Width (cm) 1.3 cm 01/23/25 1355   Wound Depth (cm) 0.2 cm 01/23/25 1355   Wound Surface Area (cm^2) 2.21 cm^2 01/23/25 1355   Wound Volume (cm^3) 0.442 cm^3 01/23/25 1355   Calculated Wound Volume (cm^3) 0.44 cm^3 01/23/25 1355   Change in Wound Size % -83.33 01/23/25 1355   Drainage Amount Moderate 01/23/25 1355   Drainage Description Serosanguineous 01/23/25 1355   Non-staged Wound Description Full thickness 01/23/25 1355                         Debridement   Wound 01/09/25 Traumatic Leg Left;Lower;Lateral    Universal Protocol:  procedure performed by consultantConsent: Verbal  consent obtained.  Risks and benefits: risks, benefits and alternatives were discussed  Consent given by: patient  Patient understanding: patient states understanding of the procedure being performed  Patient identity confirmed: verbally with patient    Debridement Details  Performed by: physician  Debridement type: selective  Pain control: lidocaine 4%      Post-debridement measurements  Length (cm): 1.7  Width (cm): 1.3  Depth (cm): 0.2  Percent debrided: 100%  Surface Area (cm^2): 2.21  Area Debrided (cm^2): 2.21  Volume (cm^3): 0.44    Devitalized tissue debrided: fibrin and slough  Instrument(s) utilized: curette  Technique utilized: nonexcisionalBleeding: medium  Hemostasis obtained with: pressure  Procedural pain (0-10): 1  Post-procedural pain: 0   Response to treatment: procedure was tolerated well                 Wound Instructions:  Orders Placed This Encounter   Procedures    Wound cleansing and dressings Traumatic Left;Lower;Lateral Leg     Traumatic Left;Lower;Lateral Leg      Wash your hands with soap and water.  Remove old dressing, discard into plastic bag and place in trash.  Cleanse the wound with mild soap and water  prior to applying a clean dressing. Do not use tissue or cotton balls. Do not scrub the wound. Pat dry using gauze.  Shower no do not get dressings wet may cover with cast cover purchase at [email or web address] or [email or web address]      Apply sliver alginate to the left leg wound.  Cover with gauze  Secure with roel and tape  Change dressing daily  Apply Spandagrip F      Elastic Tubular Stocking: Spandagrip F    Tubular elastic bandage: Apply from base of toes to behind the knee. Apply in AM, may remove for sleep.    Avoid prolonged standing in one place.    Elevate leg(s) above the level of the heart when sitting or as much as possible.        Please try to consume 3-4 servings of protein (30g) each day.   - Each serving of protein should contain about 30 mg protein.   - Good sources of protein include,  "lean meats (fish, chicken, etc), eggs, dairy products (yogurt, cheese), tofu, legumes (chickpeas, lentils, peas, black beans), nuts (cashew, walnut, peanuts, etc), & quinoa.       If you develop fever, chills, nausea or vomiting, increase in drainage or foul smelling drainage go to the closest emergency department for evaluation.      In case of inclement weather, please call [phone] to verify appointment or notify us at your earliest convienment to reschedule or cancel. Thank you.        Follow up in 2 weeks     Standing Status:   Future     Expected Date:   1/30/2025     Expiration Date:   1/30/2025    Debridement     This order was created via procedure documentation         Jessica Azevedo DPM      Portions of the record may have been created with voice recognition software. Occasional wrong word or \"sound a like\" substitutions may have occurred due to the inherent limitations of voice recognition software. Read the chart carefully and recognize, using context, where substitutions have occurred.      "

## 2025-01-23 NOTE — PATIENT INSTRUCTIONS
Orders Placed This Encounter   Procedures    Wound cleansing and dressings Traumatic Left;Lower;Lateral Leg     Traumatic Left;Lower;Lateral Leg      Wash your hands with soap and water.  Remove old dressing, discard into plastic bag and place in trash.  Cleanse the wound with mild soap and water  prior to applying a clean dressing. Do not use tissue or cotton balls. Do not scrub the wound. Pat dry using gauze.  Shower no do not get dressings wet may cover with cast cover purchase at [email or web address] or [employer]      Apply sliver alginate to the left leg wound.  Cover with gauze  Secure with roel and tape  Change dressing daily  Apply Spandagrip F      Elastic Tubular Stocking: Spandagrip F    Tubular elastic bandage: Apply from base of toes to behind the knee. Apply in AM, may remove for sleep.    Avoid prolonged standing in one place.    Elevate leg(s) above the level of the heart when sitting or as much as possible.        Please try to consume 3-4 servings of protein (30g) each day.   - Each serving of protein should contain about 30 mg protein.   - Good sources of protein include, lean meats (fish, chicken, etc), eggs, dairy products (yogurt, cheese), tofu, legumes (chickpeas, lentils, peas, black beans), nuts (cashew, walnut, peanuts, etc), & quinoa.       If you develop fever, chills, nausea or vomiting, increase in drainage or foul smelling drainage go to the closest emergency department for evaluation.      In case of inclement weather, please call [phone] to verify appointment or notify us at your earliest convienment to reschedule or cancel. Thank you.        Follow up in 2 weeks     Standing Status:   Future     Expected Date:   1/30/2025     Expiration Date:   1/30/2025    Debridement Traumatic Left;Lower;Lateral Leg     This order was created via procedure documentation

## 2025-02-04 ENCOUNTER — OFFICE VISIT (OUTPATIENT)
Dept: WOUND CARE | Facility: CLINIC | Age: 80
End: 2025-02-04
Payer: MEDICARE

## 2025-02-04 VITALS
SYSTOLIC BLOOD PRESSURE: 140 MMHG | HEART RATE: 80 BPM | RESPIRATION RATE: 19 BRPM | TEMPERATURE: 96.4 F | DIASTOLIC BLOOD PRESSURE: 80 MMHG

## 2025-02-04 DIAGNOSIS — S81.802D TRAUMATIC OPEN WOUND OF LEFT LOWER LEG WITH DELAYED HEALING: Primary | ICD-10-CM

## 2025-02-04 PROCEDURE — 99214 OFFICE O/P EST MOD 30 MIN: CPT | Performed by: PODIATRIST

## 2025-02-04 PROCEDURE — 97597 DBRDMT OPN WND 1ST 20 CM/<: CPT | Performed by: PODIATRIST

## 2025-02-04 RX ORDER — LIDOCAINE 40 MG/G
CREAM TOPICAL ONCE
Status: COMPLETED | OUTPATIENT
Start: 2025-02-04 | End: 2025-02-04

## 2025-02-04 RX ADMIN — LIDOCAINE: 40 CREAM TOPICAL at 14:41

## 2025-02-04 NOTE — PROGRESS NOTES
Wound Procedure Treatment Traumatic Left;Lower;Lateral Leg    Performed by: Stacey Smith RN  Authorized by: Jessica Azevedo DPM    Associated wounds:   Wound 01/09/25 Traumatic Leg Left;Lower;Lateral  Wound cleansed with:  NSS  Applied primary dressing:  Calcium alginate and Silver  Applied secondary dressing:  Gauze  Dressing secured with:  Tu, Tape, Size F and Elastic tubular stocking

## 2025-02-04 NOTE — PROGRESS NOTES
Patient ID: Kim Kwon is a 79 y.o. female Date of Birth 1945       Chief Complaint   Patient presents with    Follow Up Wound Care Visit     Wound left leg       Allergies:  Codeine    Diagnosis:  1. Traumatic open wound of left lower leg with delayed healing  -     lidocaine (LMX) 4 % cream  -     Wound cleansing and dressings Traumatic Left;Lower;Lateral Leg; Future; Expected date: 02/18/2025  -     Wound Procedure Treatment Traumatic Left;Lower;Lateral Leg     Diagnosis ICD-10-CM Associated Orders   1. Traumatic open wound of left lower leg with delayed healing  S81.802D lidocaine (LMX) 4 % cream     Wound cleansing and dressings Traumatic Left;Lower;Lateral Leg     Wound Procedure Treatment Traumatic Left;Lower;Lateral Leg           Assessment & Plan:  See wound orders.  (Silver alginate, DSD)     -Left leg lateral traumatic wound, continues to decrease in size  -Selective debridement performed  -DM2 A1c 6.6% 12/2024  -No further signs of infection noted  -Patient counseled on importance of lower extremity elevation for decrease in edema  -Continue Spendagrip compression to help with lower extremity edema  -Patient counseled on importance of strict glycemic control and high-protein diet  -Patient counseled on clinical signs of infection present to the ED if these occur  -Follow-up 2 week      -Vas arterial studies completed at [facility]:No hemodynamically significant right or left lower extremity arterial disease.   RIGHT Biphasic flow right common femoral artery. Biphasic flow profunda femoris   artery. Biphasic flow throughout the superficial femoral artery. Biphasic flow   popliteal artery. Biphasic flow distal posterior tibial artery. Biphasic flow   dorsalis pedis.   LEFT there is biphasic flow in the external iliac artery. There is   biphasic to triphasic flow common femoral artery. Biphasic flow profunda femoris   artery. Biphasic flow is noted throughout the superficial femoral artery.   Biphasic  flow popliteal artery. Biphasic flow distal posterior tibial artery.   Biphasic flow dorsalis pedis.     Subjective:   HPI  2/4/2025 patient presents for further evaluation and management of left anterior lateral leg wound of traumatic origin.  Patient has been compliant with dressing changes, compression, and elevation.  She denies any pain, swelling, redness in the area.  She denies any systemic signs of infection such as nausea, vomiting, fever, chills, shortness of breath    1/16/2025 patient presents today for further evaluation and management of left anterior lateral leg traumatic wound sustained in October with delayed healing.  Patient has been adherent to wound care.  She denies any significant changes in wound.  Today she completes her oral doxycycline that was prescribed by her PCP.  Denies any clinical signs of infection.  Patient states she had her vascular test performed yesterday afternoon however she has not received the results and I have not received results.  Patient denies nausea, vomiting, fever, chills, shortness of breath        1/9/2025 Patient presents for evaluation and management of left anterior leg traumatic wound delayed healing.  Injury was sustained in October and has been managed by her primary care physician.  She states the area will begin to heal and then declined.  She reports mild to moderate drainage, serosanguineous.  Patient reports intermittent pain in the area of wound with activity and while resting.  She denies any significant swelling or redness at the site of injury however has mild bilateral lower extremity swelling.  Patient initially was using nonstick dressing over the area after cleaning with warm soapy water.  Recently she was advised to leave the area open to air.  She had a slight increase in redness in the area and was prescribed doxycycline by her primary care physician on 1/7/2025 and referred to wound care for further evaluation and management.  Patient  denies nausea, vomiting, fever, chills, shortness of breath    The following portions of the patient's history were reviewed and updated as appropriate:   There is no problem list on file for this patient.    Past Medical History:   Diagnosis Date    Diabetes mellitus (HCC)     Diverticulitis     GERD (gastroesophageal reflux disease)     Hypertension      Past Surgical History:   Procedure Laterality Date    CHOLECYSTECTOMY      TONSILLECTOMY       Social History     Socioeconomic History    Marital status:      Spouse name: Not on file    Number of children: Not on file    Years of education: Not on file    Highest education level: Not on file   Occupational History    Not on file   Tobacco Use    Smoking status: Never    Smokeless tobacco: Never   Vaping Use    Vaping status: Never Used   Substance and Sexual Activity    Alcohol use: Not on file    Drug use: Not on file    Sexual activity: Not on file   Other Topics Concern    Not on file   Social History Narrative    Not on file     Social Drivers of Health     Financial Resource Strain: Low Risk  (8/23/2023)    Received from [facility]    Overall Financial Resource Strain (CARDIA)     Difficulty of Paying Living Expenses: Not hard at all   Food Insecurity: No Food Insecurity (8/23/2023)    Received from [facility]    Hunger Vital Sign     Worried About Running Out of Food in the Last Year: Never true     Ran Out of Food in the Last Year: Never true   Transportation Needs: No Transportation Needs (8/23/2023)    Received from [facility]    PRAPARE - Transportation     Lack of Transportation (Medical): No     Lack of Transportation (Non-Medical): No   Physical Activity: Not on file   Stress: No Stress Concern Present (8/23/2023)    Received from [facility]    [employer] [place] [employer] - Occupational Stress Questionnaire     Feeling of Stress : Not at all   Social  Connections: Unknown (6/18/2024)    Received from [facility]     How often do you feel lonely or isolated from those around you? (Adult - for ages 18 years and over): Not on file   Intimate Partner Violence: Not At Risk (8/23/2023)    Received from [facility]    Humiliation, Afraid, Rape, and Kick questionnaire     Fear of Current or Ex-Partner: No     Emotionally Abused: No     Physically Abused: No     Sexually Abused: No   Housing Stability: Not on file        Current Outpatient Medications:     Ascorbic Acid (vitamin C) 100 MG tablet, Take 100 mg by mouth daily, Disp: , Rfl:     aspirin (ECOTRIN LOW STRENGTH) 81 mg EC tablet, Take 81 mg by mouth daily, Disp: , Rfl:     Blood Glucose Monitoring Suppl (FreeStyle Lite) w/Device KIT, USE AS DIRECTED E11.9, Disp: , Rfl:     Cholecalciferol 25 MCG (1000 UT) capsule, Take 5,000 Units by mouth daily, Disp: , Rfl:     esomeprazole (NexIUM) 40 MG capsule, Take 40 mg by mouth if needed, Disp: , Rfl:     FREESTYLE LITE test strip, USE 1 STRIP TO CHECK GLUCOSE ONCE DAILY, Disp: , Rfl:     ibuprofen (MOTRIN) 200 mg tablet, Take 200 mg by mouth every 6 (six) hours as needed, Disp: , Rfl:     meloxicam (MOBIC) 15 mg tablet, Take 15 mg by mouth if needed, Disp: , Rfl:     metFORMIN (GLUCOPHAGE-XR) 500 mg 24 hr tablet, Take 500 mg by mouth daily with breakfast, Disp: , Rfl:     metoprolol succinate (TOPROL-XL) 50 mg 24 hr tablet, Take 50 mg by mouth daily, Disp: , Rfl:     Multiple Vitamins-Minerals (CENTRUM SILVER PO), Take by mouth, Disp: , Rfl:     olmesartan-hydrochlorothiazide (BENICAR HCT) 20-12.5 MG per tablet, Take 1 tablet by mouth daily, Disp: , Rfl:   No current facility-administered medications for this visit.  No family history on file.       Review of Systems  Constitutional:  Negative for chills and fever.   Respiratory:  Negative for chest tightness and shortness of breath.    Cardiovascular:  Positive for leg swelling.    Musculoskeletal:  Positive for arthralgias, back pain and gait problem.   Skin:  Positive for wound.     Objective:  /80   Pulse 80   Temp (!) 96.4 °F (35.8 °C)   Resp 19     Physical Exam  Constitutional:       General: She is not in acute distress.     Appearance: She is not ill-appearing.   Cardiovascular:      Comments: Left DP pulse 1/4, PT pulse 0/4  CRT less than 3 seconds  Absent hair growth to distal digits  Mild nonpitting edema to bilateral lower extremities  Pulmonary:      Effort: No respiratory distress.   Musculoskeletal:      Comments: MMT 5/5 at level of ankle bilaterally  No pain with palpitation of posterior calves bilaterally   Skin:     Comments: Left anterior lateral leg traumatic wound with fibrogranular base.  Less fibrotic tissue.  Trace erythema in periwound area, improved since last exam.  No significant localized edema.  Mild tenderness with direct palpation.  No crepitus, no fluctuance, no malodor.  Mild serosanguineous drainage   Neurological:      Comments: Gross sensation intact to bilateral feet       Wound 01/09/25 Traumatic Leg Left;Lower;Lateral (Active)   Wound Image   02/04/25 1430   Wound Description Yellow;Slough;Pink 02/04/25 1431   Sabrina-wound Assessment Scar Tissue;Pink;Dry;Scaly 02/04/25 1431   Wound Length (cm) 1.6 cm 02/04/25 1431   Wound Width (cm) 1 cm 02/04/25 1431   Wound Depth (cm) 0.1 cm 02/04/25 1431   Wound Surface Area (cm^2) 1.6 cm^2 02/04/25 1431   Wound Volume (cm^3) 0.16 cm^3 02/04/25 1431   Calculated Wound Volume (cm^3) 0.16 cm^3 02/04/25 1431   Change in Wound Size % 33.33 02/04/25 1431   Drainage Amount Moderate 02/04/25 1431   Drainage Description Serosanguineous 02/04/25 1431   Non-staged Wound Description Full thickness 02/04/25 1431         Debridement    Universal Protocol:  procedure performed by consultantConsent: Verbal consent obtained.  Risks and benefits: risks, benefits and alternatives were discussed  Consent given by:  patient  Patient understanding: patient states understanding of the procedure being performed  Patient identity confirmed: verbally with patient    Debridement Details  Performed by: physician  Debridement type: selective  Pain control: lidocaine 4%      Post-debridement measurements  Length (cm): 1.6  Width (cm): 1  Depth (cm): 0.1  Percent debrided: 100%  Surface Area (cm^2): 1.6  Area Debrided (cm^2): 1.6  Volume (cm^3): 0.16    Devitalized tissue debrided: fibrin  Instrument(s) utilized: curette  Technique utilized: nonexcisionalBleeding: medium  Hemostasis obtained with: pressure  Procedural pain (0-10): 0  Post-procedural pain: 0   Response to treatment: procedure was tolerated well                 Wound Instructions:  Orders Placed This Encounter   Procedures    Wound cleansing and dressings Traumatic Left;Lower;Lateral Leg     · Wound cleansing and dressings Traumatic Left;Lower;Lateral Leg      Traumatic Left;Lower;Lateral Leg        Wash your hands with soap and water.  Remove old dressing, discard into plastic bag and place in trash.  Cleanse the wound with mild soap and water  prior to applying a clean dressing. Do not use tissue or cotton balls. Do not scrub the wound. Pat dry using gauze.  Shower no do not get dressings wet may cover with cast cover purchase at [email or web address] or [employer]      Apply sliver alginate to the left leg wound.  Cover with gauze  Secure with roel and tape  Change dressing daily  Apply Spandagrip F        Elastic Tubular Stocking: Spandagrip F     Tubular elastic bandage: Apply from base of toes to behind the knee. Apply in AM, may remove for sleep.     Avoid prolonged standing in one place.     Elevate leg(s) above the level of the heart when sitting or as much as possible.          Please try to consume 3-4 servings of protein (30g) each day.   - Each serving of protein should contain about 30 mg protein.   - Good sources of protein include, lean meats (fish, chicken, etc), eggs,  "dairy products (yogurt, cheese), tofu, legumes (chickpeas, lentils, peas, black beans), nuts (cashew, walnut, peanuts, etc), & quinoa.       If you develop fever, chills, nausea or vomiting, increase in drainage or foul smelling drainage go to the closest emergency department for evaluation.       In case of inclement weather, please call [phone] to verify appointment or notify us at your earliest convienment to reschedule or cancel. Thank you.         Follow up in 2 weeks     Standing Status:   Future     Expected Date:   2/18/2025     Expiration Date:   2/18/2025    Wound Procedure Treatment Traumatic Left;Lower;Lateral Leg     This order was created via procedure documentation    Debridement     This order was created via procedure documentation         Jessica Azevedo DPM      Portions of the record may have been created with voice recognition software. Occasional wrong word or \"sound a like\" substitutions may have occurred due to the inherent limitations of voice recognition software. Read the chart carefully and recognize, using context, where substitutions have occurred.     "

## 2025-02-04 NOTE — PATIENT INSTRUCTIONS
Orders Placed This Encounter   Procedures    Wound cleansing and dressings Traumatic Left;Lower;Lateral Leg     · Wound cleansing and dressings Traumatic Left;Lower;Lateral Leg      Traumatic Left;Lower;Lateral Leg        Wash your hands with soap and water.  Remove old dressing, discard into plastic bag and place in trash.  Cleanse the wound with mild soap and water  prior to applying a clean dressing. Do not use tissue or cotton balls. Do not scrub the wound. Pat dry using gauze.  Shower no do not get dressings wet may cover with cast cover purchase at [employer] or [employer]      Apply sliver alginate to the left leg wound.  Cover with gauze  Secure with roel and tape  Change dressing daily  Apply Spandagrip F        Elastic Tubular Stocking: Spandagrip F     Tubular elastic bandage: Apply from base of toes to behind the knee. Apply in AM, may remove for sleep.     Avoid prolonged standing in one place.     Elevate leg(s) above the level of the heart when sitting or as much as possible.          Please try to consume 3-4 servings of protein (30g) each day.   - Each serving of protein should contain about 30 mg protein.   - Good sources of protein include, lean meats (fish, chicken, etc), eggs, dairy products (yogurt, cheese), tofu, legumes (chickpeas, lentils, peas, black beans), nuts (cashew, walnut, peanuts, etc), & quinoa.       If you develop fever, chills, nausea or vomiting, increase in drainage or foul smelling drainage go to the closest emergency department for evaluation.       In case of inclement weather, please call [phone] to verify appointment or notify us at your earliest convienment to reschedule or cancel. Thank you.         Follow up in 2 weeks     Standing Status:   Future     Expected Date:   2/18/2025     Expiration Date:   2/18/2025    Wound Procedure Treatment Traumatic Left;Lower;Lateral Leg     This order was created via procedure documentation

## 2025-02-18 ENCOUNTER — OFFICE VISIT (OUTPATIENT)
Dept: WOUND CARE | Facility: CLINIC | Age: 80
End: 2025-02-18
Payer: MEDICARE

## 2025-02-18 VITALS
RESPIRATION RATE: 18 BRPM | TEMPERATURE: 97.5 F | SYSTOLIC BLOOD PRESSURE: 132 MMHG | DIASTOLIC BLOOD PRESSURE: 80 MMHG | HEART RATE: 76 BPM

## 2025-02-18 DIAGNOSIS — S81.802D TRAUMATIC OPEN WOUND OF LEFT LOWER LEG WITH DELAYED HEALING: Primary | ICD-10-CM

## 2025-02-18 DIAGNOSIS — S81.801A TRAUMATIC OPEN WOUND OF RIGHT LOWER LEG, INITIAL ENCOUNTER: ICD-10-CM

## 2025-02-18 PROCEDURE — 97597 DBRDMT OPN WND 1ST 20 CM/<: CPT | Performed by: PODIATRIST

## 2025-02-18 PROCEDURE — 99213 OFFICE O/P EST LOW 20 MIN: CPT | Performed by: PODIATRIST

## 2025-02-18 PROCEDURE — 99214 OFFICE O/P EST MOD 30 MIN: CPT | Performed by: PODIATRIST

## 2025-02-18 RX ORDER — MUPIROCIN 20 MG/G
OINTMENT TOPICAL DAILY
Qty: 30 G | Refills: 1 | Status: SHIPPED | OUTPATIENT
Start: 2025-02-18

## 2025-02-18 RX ORDER — LIDOCAINE 40 MG/G
CREAM TOPICAL ONCE
Status: COMPLETED | OUTPATIENT
Start: 2025-02-18 | End: 2025-02-18

## 2025-02-18 RX ADMIN — LIDOCAINE: 40 CREAM TOPICAL at 14:34

## 2025-02-18 NOTE — PROGRESS NOTES
Wound Procedure Treatment Right;Lateral;Lower Leg    Performed by: Stacey Smith RN  Authorized by: Jessica Azevedo DPM    Associated wounds:   Wound 02/18/25 Leg Right;Lateral;Lower  Wound cleansed with:  NSS   Applied topical:  Mupirocin ointment   Applied secondary dressing:  Gauze   Dressing secured with:  Tape, Size F and Elastic tubular stocking

## 2025-02-18 NOTE — PROGRESS NOTES
Patient ID: Kim Kwon is a 79 y.o. female Date of Birth 1945       Chief Complaint   Patient presents with    Follow Up Wound Care Visit     Left lower leg wound. New wound to right lower leg.       Allergies:  Codeine    Diagnosis:  1. Traumatic open wound of left lower leg with delayed healing  -     lidocaine (LMX) 4 % cream  -     Wound cleansing and dressings Traumatic Left;Lower;Lateral Leg; Future  -     Wound Procedure Treatment Traumatic Left;Lower;Lateral Leg  2. Traumatic open wound of right lower leg, initial encounter  -     Wound cleansing and dressings Right;Lateral;Lower Leg; Future  -     Wound Procedure Treatment Right;Lateral;Lower Leg     Diagnosis ICD-10-CM Associated Orders   1. Traumatic open wound of left lower leg with delayed healing  S81.802D lidocaine (LMX) 4 % cream     Wound cleansing and dressings Traumatic Left;Lower;Lateral Leg     Wound Procedure Treatment Traumatic Left;Lower;Lateral Leg      2. Traumatic open wound of right lower leg, initial encounter  S81.801A Wound cleansing and dressings Right;Lateral;Lower Leg     Wound Procedure Treatment Right;Lateral;Lower Leg           Assessment & Plan:  See wound orders. (Silver alginate, DSD)     -Left leg lateral traumatic wound, continues to decrease in size, serous drainage noted, no SOI  -New right distal anterior lateral leg wound.  Traumatic origin present for over a month however patient has not mentioned it due to overlying scab.  Fibrogranular no clinical signs of infection  -Selective debridement performed both wounds  -DM2 A1c 6.6% 12/2024  -Continue with left lower extremity wound care regiment  -Wound care for right lower extremity, mupirocin ointment with DSD  -Patient counseled on importance of lower extremity elevation for decrease in edema  -Continue Spendagrip compression bilaterally to help with lower extremity edema  -Patient counseled on importance of strict glycemic control and high-protein diet  -Patient  counseled on clinical signs of infection present to the ED if these occur  -Follow-up 1 week        -Vas arterial studies completed at [facility]:No hemodynamically significant right or left lower extremity arterial disease.   RIGHT Biphasic flow right common femoral artery. Biphasic flow profunda femoris   artery. Biphasic flow throughout the superficial femoral artery. Biphasic flow   popliteal artery. Biphasic flow distal posterior tibial artery. Biphasic flow   dorsalis pedis.   LEFT there is biphasic flow in the external iliac artery. There is   biphasic to triphasic flow common femoral artery. Biphasic flow profunda femoris   artery. Biphasic flow is noted throughout the superficial femoral artery.   Biphasic flow popliteal artery. Biphasic flow distal posterior tibial artery.   Biphasic flow dorsalis pedis.     Subjective:   HPI    2/18/2025 patient presents for evaluation and management of left anterior lateral leg wound of traumatic origin.  She has been adherent to wound care.  She denies any redness, swelling, drainage changes, or pain.  Patient reports a new wound on the right anterior lateral leg.  She states she hit her leg over a month ago and a scab had been present.  The scab fell off with underlying wound being noted.  She states the area has mild inflammation but she denies any purulence, swelling, or significant pain.  Denies nausea, vomiting, fever, chills, shortness of breath    2/4/2025 patient presents for further evaluation and management of left anterior lateral leg wound of traumatic origin.  Patient has been compliant with dressing changes, compression, and elevation.  She denies any pain, swelling, redness in the area.  She denies any systemic signs of infection such as nausea, vomiting, fever, chills, shortness of breath     1/16/2025 patient presents today for further evaluation and management of left anterior lateral leg traumatic wound sustained in October with delayed healing.  Patient  has been adherent to wound care.  She denies any significant changes in wound.  Today she completes her oral doxycycline that was prescribed by her PCP.  Denies any clinical signs of infection.  Patient states she had her vascular test performed yesterday afternoon however she has not received the results and I have not received results.  Patient denies nausea, vomiting, fever, chills, shortness of breath        1/9/2025 Patient presents for evaluation and management of left anterior leg traumatic wound delayed healing.  Injury was sustained in October and has been managed by her primary care physician.  She states the area will begin to heal and then declined.  She reports mild to moderate drainage, serosanguineous.  Patient reports intermittent pain in the area of wound with activity and while resting.  She denies any significant swelling or redness at the site of injury however has mild bilateral lower extremity swelling.  Patient initially was using nonstick dressing over the area after cleaning with warm soapy water.  Recently she was advised to leave the area open to air.  She had a slight increase in redness in the area and was prescribed doxycycline by her primary care physician on 1/7/2025 and referred to wound care for further evaluation and management.  Patient denies nausea, vomiting, fever, chills, shortness of breath    The following portions of the patient's history were reviewed and updated as appropriate:   There is no problem list on file for this patient.    Past Medical History:   Diagnosis Date    Diabetes mellitus (HCC)     Diverticulitis     GERD (gastroesophageal reflux disease)     Hypertension      Past Surgical History:   Procedure Laterality Date    CHOLECYSTECTOMY      TONSILLECTOMY       Social History     Socioeconomic History    Marital status:      Spouse name: Not on file    Number of children: Not on file    Years of education: Not on file    Highest education level: Not on  file   Occupational History    Not on file   Tobacco Use    Smoking status: Never    Smokeless tobacco: Never   Vaping Use    Vaping status: Never Used   Substance and Sexual Activity    Alcohol use: Not on file    Drug use: Not on file    Sexual activity: Not on file   Other Topics Concern    Not on file   Social History Narrative    Not on file     Social Drivers of Health     Financial Resource Strain: Low Risk  (8/23/2023)    Received from [facility]    Overall Financial Resource Strain (CARDIA)     Difficulty of Paying Living Expenses: Not hard at all   Food Insecurity: No Food Insecurity (8/23/2023)    Received from [facility]    Hunger Vital Sign     Worried About Running Out of Food in the Last Year: Never true     Ran Out of Food in the Last Year: Never true   Transportation Needs: No Transportation Needs (8/23/2023)    Received from [facility]    PRAPARE - Transportation     Lack of Transportation (Medical): No     Lack of Transportation (Non-Medical): No   Physical Activity: Not on file   Stress: No Stress Concern Present (8/23/2023)    Received from [facility]    [employer] - Occupational Stress Questionnaire     Feeling of Stress : Not at all   Social Connections: Unknown (6/18/2024)    Received from [facility]     How often do you feel lonely or isolated from those around you? (Adult - for ages 18 years and over): Not on file   Intimate Partner Violence: Not At Risk (8/23/2023)    Received from [facility]    Humiliation, Afraid, Rape, and Kick questionnaire     Fear of Current or Ex-Partner: No     Emotionally Abused: No     Physically Abused: No     Sexually Abused: No   Housing Stability: Not on file        Current Outpatient Medications:     Ascorbic Acid (vitamin C) 100 MG tablet, Take 100 mg by mouth daily, Disp: , Rfl:     aspirin (ECOTRIN LOW STRENGTH) 81  mg EC tablet, Take 81 mg by mouth daily, Disp: , Rfl:     Blood Glucose Monitoring Suppl (FreeStyle Lite) w/Device KIT, USE AS DIRECTED E11.9, Disp: , Rfl:     Cholecalciferol 25 MCG (1000 UT) capsule, Take 5,000 Units by mouth daily, Disp: , Rfl:     esomeprazole (NexIUM) 40 MG capsule, Take 40 mg by mouth if needed, Disp: , Rfl:     FREESTYLE LITE test strip, USE 1 STRIP TO CHECK GLUCOSE ONCE DAILY, Disp: , Rfl:     ibuprofen (MOTRIN) 200 mg tablet, Take 200 mg by mouth every 6 (six) hours as needed, Disp: , Rfl:     meloxicam (MOBIC) 15 mg tablet, Take 15 mg by mouth if needed, Disp: , Rfl:     metFORMIN (GLUCOPHAGE-XR) 500 mg 24 hr tablet, Take 500 mg by mouth daily with breakfast, Disp: , Rfl:     metoprolol succinate (TOPROL-XL) 50 mg 24 hr tablet, Take 50 mg by mouth daily, Disp: , Rfl:     Multiple Vitamins-Minerals (CENTRUM SILVER PO), Take by mouth, Disp: , Rfl:     olmesartan-hydrochlorothiazide (BENICAR HCT) 20-12.5 MG per tablet, Take 1 tablet by mouth daily, Disp: , Rfl:   No current facility-administered medications for this visit.  No family history on file.   Review of Systems  Constitutional:  Negative for chills and fever.   Respiratory:  Negative for chest tightness and shortness of breath.    Cardiovascular:  Positive for leg swelling.   Musculoskeletal:  Positive for arthralgias, back pain and gait problem.   Skin:  Positive for wound.        Objective:  /80   Pulse 76   Temp 97.5 °F (36.4 °C)   Resp 18     Physical Exam  Constitutional:       General: She is not in acute distress.     Appearance: She is not ill-appearing.   Cardiovascular:      Comments: Left DP pulse 1/4, PT pulse 0/4  CRT less than 3 seconds  Absent hair growth to distal digits  Mild nonpitting edema to bilateral lower extremities  Pulmonary:      Effort: No respiratory distress.   Musculoskeletal:      Comments: MMT 5/5 at level of ankle bilaterally  No pain with palpitation of posterior calves bilaterally    Skin:     Comments: Left anterior lateral leg traumatic wound with fibrogranular base.  Less fibrotic tissue.  Trace erythema in periwound area, improved since last exam.  No significant localized edema.  Mild tenderness with direct palpation.  No crepitus, no fluctuance, no malodor.  Mild serosanguineous drainage     New right lower extremity wound identified.  Fibrogranular base.  Surrounding inflammation without acute erythema.  No significant edema.  Nontender with palpation.  Trace serous drainage expressed.  No purulence expressed, no crepitus, no fluctuance, no malodor      Neurological:      Comments: Gross sensation intact to bilateral feet       Wound 01/09/25 Traumatic Leg Left;Lower;Lateral (Active)   Wound Image   02/18/25 1427   Wound Description Hypergranulation;Granulation tissue;Pink;Slough 02/18/25 1432   Sabrina-wound Assessment Scar Tissue;Dry;Scaly 02/18/25 1432   Wound Length (cm) 1.3 cm 02/18/25 1432   Wound Width (cm) 1 cm 02/18/25 1432   Wound Depth (cm) 0.1 cm 02/18/25 1432   Wound Surface Area (cm^2) 1.3 cm^2 02/18/25 1432   Wound Volume (cm^3) 0.13 cm^3 02/18/25 1432   Calculated Wound Volume (cm^3) 0.13 cm^3 02/18/25 1432   Change in Wound Size % 45.83 02/18/25 1432   Drainage Amount Small 02/18/25 1432   Drainage Description Serosanguineous 02/18/25 1432   Non-staged Wound Description Full thickness 02/18/25 1432       Wound 02/18/25 Leg Right;Lateral;Lower (Active)   Wound Image   02/18/25 1427   Wound Description Pink;Slough;Yellow 02/18/25 1433   Sabrina-wound Assessment Edema;Pink 02/18/25 1433   Wound Length (cm) 0.6 cm 02/18/25 1433   Wound Width (cm) 0.9 cm 02/18/25 1433   Wound Depth (cm) 0.1 cm 02/18/25 1433   Wound Surface Area (cm^2) 0.54 cm^2 02/18/25 1433   Wound Volume (cm^3) 0.054 cm^3 02/18/25 1433   Calculated Wound Volume (cm^3) 0.05 cm^3 02/18/25 1433   Drainage Amount Small 02/18/25 1433   Drainage Description Serosanguineous 02/18/25 1433   Non-staged Wound Description  "Full thickness 02/18/25 1433                         Debridement   Wound 01/09/25 Traumatic Leg Left;Lower;Lateral    Universal Protocol:  procedure performed by consultantConsent: Verbal consent obtained.  Risks and benefits: risks, benefits and alternatives were discussed  Consent given by: patient  Time out: Immediately prior to procedure a \"time out\" was called to verify the correct patient, procedure, equipment, support staff and site/side marked as required.  Patient understanding: patient states understanding of the procedure being performed  Patient identity confirmed: verbally with patient    Debridement Details  Performed by: physician  Debridement type: selective  Pain control: lidocaine 4%      Post-debridement measurements  Length (cm): 1.3  Width (cm): 1  Depth (cm): 0.1  Percent debrided: 100%  Surface Area (cm^2): 1.3  Area Debrided (cm^2): 1.3  Volume (cm^3): 0.13    Devitalized tissue debrided: fibrin  Instrument(s) utilized: curette  Technique utilized: nonexcisionalBleeding: small  Hemostasis obtained with: pressure  Procedural pain (0-10): 0  Post-procedural pain: 0   Response to treatment: procedure was tolerated well    Debridement    Universal Protocol:  procedure performed by consultantConsent: Verbal consent obtained.  Risks and benefits: risks, benefits and alternatives were discussed  Consent given by: patient  Time out: Immediately prior to procedure a \"time out\" was called to verify the correct patient, procedure, equipment, support staff and site/side marked as required.  Patient understanding: patient states understanding of the procedure being performed  Patient identity confirmed: verbally with patient    Debridement Details  Performed by: physician  Debridement type: selective  Pain control: lidocaine 4%      Post-debridement measurements  Length (cm): 0.6  Width (cm): 0.9  Depth (cm): 0.1  Percent debrided: 100%  Surface Area (cm^2): 0.54  Area Debrided (cm^2): 0.54  Volume " (cm^3): 0.05    Devitalized tissue debrided: fibrin and slough  Instrument(s) utilized: curette  Technique utilized: nonexcisionalBleeding: small  Hemostasis obtained with: pressure  Procedural pain (0-10): 0  Post-procedural pain: 0   Response to treatment: procedure was tolerated well                 Wound Instructions:  Orders Placed This Encounter   Procedures    Wound cleansing and dressings Traumatic Left;Lower;Lateral Leg     Wash your hands with soap and water.  Remove old dressing, discard into plastic bag and place in trash.  Cleanse the wound with mild soap and water  prior to applying a clean dressing. Do not use tissue or cotton balls. Do not scrub the wound. Pat dry using gauze.  Shower no do not get dressings wet may cover with cast cover purchase at [facility] or [facility]      Apply sliver alginate to the left leg wound.  Cover with gauze  Secure with roel and tape  Change dressing daily  Apply Spandagrip F        Elastic Tubular Stocking: Spandagrip F     Tubular elastic bandage: Apply from base of toes to behind the knee. Apply in AM, may remove for sleep.     Avoid prolonged standing in one place.     Elevate leg(s) above the level of the heart when sitting or as much as possible.          Please try to consume 3-4 servings of protein (30g) each day.   - Each serving of protein should contain about 30 mg protein.   - Good sources of protein include, lean meats (fish, chicken, etc), eggs, dairy products (yogurt, cheese), tofu, legumes (chickpeas, lentils, peas, black beans), nuts (cashew, walnut, peanuts, etc), & quinoa.       If you develop fever, chills, nausea or vomiting, increase in drainage or foul smelling drainage go to the closest emergency department for evaluation.       In case of inclement weather, please call [phone] to verify appointment or notify us at your earliest convienment to reschedule or cancel. Thank you.         Follow up in 1 week     Standing Status:   Future      "Expiration Date:   2/25/2025    Wound cleansing and dressings Right;Lateral;Lower Leg     Right Lower Leg wound    Wash your hands with soap and water.  Remove old dressing, discard into plastic bag and place in trash.    Cleanse the wound with mild soap (dove) and water prior to applying a clean dressing. Do not use tissue or cotton balls.   Do not scrub the wound. Pat dry using gauze.  Shower yes     Apply mupirocin to the wound.  Cover with gauze  Secure with roel  Change dressing daily    Elastic Tubular Stocking    Tubular elastic bandage: Apply from base of toes to behind the knee. Apply in AM, may remove for sleep.    Avoid prolonged standing in one place.    Elevate leg(s) above the level of the heart when sitting or as much as possible.     Standing Status:   Future     Expiration Date:   2/25/2025    Wound Procedure Treatment Right;Lateral;Lower Leg     This order was created via procedure documentation    Wound Procedure Treatment Traumatic Left;Lower;Lateral Leg     This order was created via procedure documentation         Jessica Azevedo DPM      Portions of the record may have been created with voice recognition software. Occasional wrong word or \"sound a like\" substitutions may have occurred due to the inherent limitations of voice recognition software. Read the chart carefully and recognize, using context, where substitutions have occurred.     "

## 2025-02-18 NOTE — PATIENT INSTRUCTIONS
Orders Placed This Encounter   Procedures    Wound cleansing and dressings Traumatic Left;Lower;Lateral Leg     Wash your hands with soap and water.  Remove old dressing, discard into plastic bag and place in trash.  Cleanse the wound with mild soap and water  prior to applying a clean dressing. Do not use tissue or cotton balls. Do not scrub the wound. Pat dry using gauze.  Shower no do not get dressings wet may cover with cast cover purchase at [facility] or [facility]      Apply sliver alginate to the left leg wound.  Cover with gauze  Secure with roel and tape  Change dressing daily  Apply Spandagrip F        Elastic Tubular Stocking: Spandagrip F     Tubular elastic bandage: Apply from base of toes to behind the knee. Apply in AM, may remove for sleep.     Avoid prolonged standing in one place.     Elevate leg(s) above the level of the heart when sitting or as much as possible.          Please try to consume 3-4 servings of protein (30g) each day.   - Each serving of protein should contain about 30 mg protein.   - Good sources of protein include, lean meats (fish, chicken, etc), eggs, dairy products (yogurt, cheese), tofu, legumes (chickpeas, lentils, peas, black beans), nuts (cashew, walnut, peanuts, etc), & quinoa.       If you develop fever, chills, nausea or vomiting, increase in drainage or foul smelling drainage go to the closest emergency department for evaluation.       In case of inclement weather, please call [phone] to verify appointment or notify us at your earliest convienment to reschedule or cancel. Thank you.         Follow up in 1 week     Standing Status:   Future     Expiration Date:   2/25/2025    Wound cleansing and dressings Right;Lateral;Lower Leg     Right Lower Leg wound    Wash your hands with soap and water.  Remove old dressing, discard into plastic bag and place in trash.    Cleanse the wound with mild soap (dove) and water prior to applying a clean dressing. Do not use tissue or  cotton balls.   Do not scrub the wound. Pat dry using gauze.  Shower yes     Apply mupirocin to the wound.  Cover with gauze  Secure with roel  Change dressing daily    Elastic Tubular Stocking    Tubular elastic bandage: Apply from base of toes to behind the knee. Apply in AM, may remove for sleep.    Avoid prolonged standing in one place.    Elevate leg(s) above the level of the heart when sitting or as much as possible.     Standing Status:   Future     Expiration Date:   2/25/2025

## 2025-02-25 ENCOUNTER — OFFICE VISIT (OUTPATIENT)
Dept: WOUND CARE | Facility: CLINIC | Age: 80
End: 2025-02-25
Payer: MEDICARE

## 2025-02-25 VITALS
RESPIRATION RATE: 18 BRPM | DIASTOLIC BLOOD PRESSURE: 80 MMHG | HEART RATE: 78 BPM | TEMPERATURE: 97.4 F | SYSTOLIC BLOOD PRESSURE: 138 MMHG

## 2025-02-25 DIAGNOSIS — S81.802D TRAUMATIC OPEN WOUND OF LEFT LOWER LEG WITH DELAYED HEALING: Primary | ICD-10-CM

## 2025-02-25 DIAGNOSIS — S81.801A TRAUMATIC OPEN WOUND OF RIGHT LOWER LEG, INITIAL ENCOUNTER: ICD-10-CM

## 2025-02-25 PROCEDURE — 97597 DBRDMT OPN WND 1ST 20 CM/<: CPT | Performed by: PODIATRIST

## 2025-02-25 PROCEDURE — 11042 DBRDMT SUBQ TIS 1ST 20SQCM/<: CPT | Performed by: PODIATRIST

## 2025-02-25 RX ORDER — LIDOCAINE 40 MG/G
CREAM TOPICAL ONCE
Status: COMPLETED | OUTPATIENT
Start: 2025-02-25 | End: 2025-02-25

## 2025-02-25 RX ADMIN — LIDOCAINE: 40 CREAM TOPICAL at 16:03

## 2025-02-25 NOTE — PROGRESS NOTES
Patient ID: Kim Kwon is a 79 y.o. female Date of Birth 1945       Chief Complaint   Patient presents with    Follow Up Wound Care Visit     BLLE wounds        Allergies:  Codeine    Diagnosis:  1. Traumatic open wound of left lower leg with delayed healing  -     Wound cleansing and dressings Traumatic Left;Lower;Lateral Leg; Future; Expected date: 03/04/2025  -     lidocaine (LMX) 4 % cream  -     Wound Procedure Treatment Traumatic Left;Lower;Lateral Leg  2. Traumatic open wound of right lower leg, initial encounter  -     Wound cleansing and dressings; Future; Expected date: 03/04/2025  -     lidocaine (LMX) 4 % cream  -     Wound Procedure Treatment Right;Lateral;Lower Leg     Diagnosis ICD-10-CM Associated Orders   1. Traumatic open wound of left lower leg with delayed healing  S81.802D Wound cleansing and dressings Traumatic Left;Lower;Lateral Leg     lidocaine (LMX) 4 % cream     Wound Procedure Treatment Traumatic Left;Lower;Lateral Leg      2. Traumatic open wound of right lower leg, initial encounter  S81.801A Wound cleansing and dressings     lidocaine (LMX) 4 % cream     Wound Procedure Treatment Right;Lateral;Lower Leg           Assessment & Plan:  See wound orders. (Left PolyMem silver, right medihoney)     -Left leg lateral traumatic wound, continues to decrease in size, serous drainage noted, no SOI.  Selective debridement performed dressing change to polymen Ag   -Right distal anterior lateral leg wound. Fibrotic slough . Surigal debridement.  Medihoney DSD  -DM2 A1c 6.6% 12/2024  -Patient counseled on importance of lower extremity elevation for decrease in edema  -Continue Spendagrip compression bilaterally to help with lower extremity edema  -Patient counseled on importance of strict glycemic control and high-protein diet  -Patient counseled on clinical signs of infection present to the ED if these occur  -Follow-up 1 week        -Vas arterial studies completed at [facility]:No hemodynamically  significant right or left lower extremity arterial disease.   RIGHT Biphasic flow right common femoral artery. Biphasic flow profunda femoris   artery. Biphasic flow throughout the superficial femoral artery. Biphasic flow   popliteal artery. Biphasic flow distal posterior tibial artery. Biphasic flow   dorsalis pedis.   LEFT there is biphasic flow in the external iliac artery. There is   biphasic to triphasic flow common femoral artery. Biphasic flow profunda femoris   artery. Biphasic flow is noted throughout the superficial femoral artery.   Biphasic flow popliteal artery. Biphasic flow distal posterior tibial artery.   Biphasic flow dorsalis pedis.     Subjective:   HPI  2/25/2025 patient presents for evaluation management of left anterior lateral leg wound and right distal lateral leg wound.  Patient has been adherent with dressing changes and compression.  She denies any increase in pain, swelling, or drainage.  She denies any nausea, vomiting, fever, chills, shortness of breath    2/18/2025 patient presents for evaluation and management of left anterior lateral leg wound of traumatic origin.  She has been adherent to wound care.  She denies any redness, swelling, drainage changes, or pain.  Patient reports a new wound on the right anterior lateral leg.  She states she hit her leg over a month ago and a scab had been present.  The scab fell off with underlying wound being noted.  She states the area has mild inflammation but she denies any purulence, swelling, or significant pain.  Denies nausea, vomiting, fever, chills, shortness of breath     2/4/2025 patient presents for further evaluation and management of left anterior lateral leg wound of traumatic origin.  Patient has been compliant with dressing changes, compression, and elevation.  She denies any pain, swelling, redness in the area.  She denies any systemic signs of infection such as nausea, vomiting, fever, chills, shortness of breath     1/16/2025  patient presents today for further evaluation and management of left anterior lateral leg traumatic wound sustained in October with delayed healing.  Patient has been adherent to wound care.  She denies any significant changes in wound.  Today she completes her oral doxycycline that was prescribed by her PCP.  Denies any clinical signs of infection.  Patient states she had her vascular test performed yesterday afternoon however she has not received the results and I have not received results.  Patient denies nausea, vomiting, fever, chills, shortness of breath        1/9/2025 Patient presents for evaluation and management of left anterior leg traumatic wound delayed healing.  Injury was sustained in October and has been managed by her primary care physician.  She states the area will begin to heal and then declined.  She reports mild to moderate drainage, serosanguineous.  Patient reports intermittent pain in the area of wound with activity and while resting.  She denies any significant swelling or redness at the site of injury however has mild bilateral lower extremity swelling.  Patient initially was using nonstick dressing over the area after cleaning with warm soapy water.  Recently she was advised to leave the area open to air.  She had a slight increase in redness in the area and was prescribed doxycycline by her primary care physician on 1/7/2025 and referred to wound care for further evaluation and management.  Patient denies nausea, vomiting, fever, chills, shortness of breath    The following portions of the patient's history were reviewed and updated as appropriate:   There is no problem list on file for this patient.    Past Medical History:   Diagnosis Date    Diabetes mellitus (HCC)     Diverticulitis     GERD (gastroesophageal reflux disease)     Hypertension      Past Surgical History:   Procedure Laterality Date    CHOLECYSTECTOMY      TONSILLECTOMY       Social History     Socioeconomic History     Marital status:      Spouse name: Not on file    Number of children: Not on file    Years of education: Not on file    Highest education level: Not on file   Occupational History    Not on file   Tobacco Use    Smoking status: Never    Smokeless tobacco: Never   Vaping Use    Vaping status: Never Used   Substance and Sexual Activity    Alcohol use: Not on file    Drug use: Not on file    Sexual activity: Not on file   Other Topics Concern    Not on file   Social History Narrative    Not on file     Social Drivers of Health     Financial Resource Strain: Low Risk  (8/23/2023)    Received from [facility]    Overall Financial Resource Strain (CARDIA)     Difficulty of Paying Living Expenses: Not hard at all   Food Insecurity: No Food Insecurity (8/23/2023)    Received from [facility]    Hunger Vital Sign     Worried About Running Out of Food in the Last Year: Never true     Ran Out of Food in the Last Year: Never true   Transportation Needs: No Transportation Needs (8/23/2023)    Received from [facility]    PRAPARE - Transportation     Lack of Transportation (Medical): No     Lack of Transportation (Non-Medical): No   Physical Activity: Not on file   Stress: No Stress Concern Present (8/23/2023)    Received from [facility]    Citizen of [place] [employer] - Occupational Stress Questionnaire     Feeling of Stress : Not at all   Social Connections: Unknown (6/18/2024)    Received from [facility]    Social pinion-pins     How often do you feel lonely or isolated from those around you? (Adult - for ages 18 years and over): Not on file   Intimate Partner Violence: Not At Risk (8/23/2023)    Received from [facility]    Humiliation, Afraid, Rape, and Kick questionnaire     Fear of Current or Ex-Partner: No     Emotionally Abused: No     Physically Abused: No     Sexually Abused: No   Housing Stability: Not on file         Current Outpatient Medications:     Ascorbic Acid (vitamin C) 100 MG tablet, Take 100 mg by mouth daily, Disp: , Rfl:     aspirin (ECOTRIN LOW STRENGTH) 81 mg EC tablet, Take 81 mg by mouth daily, Disp: , Rfl:     Blood Glucose Monitoring Suppl (FreeStyle Lite) w/Device KIT, USE AS DIRECTED E11.9, Disp: , Rfl:     Cholecalciferol 25 MCG (1000 UT) capsule, Take 5,000 Units by mouth daily, Disp: , Rfl:     esomeprazole (NexIUM) 40 MG capsule, Take 40 mg by mouth if needed, Disp: , Rfl:     FREESTYLE LITE test strip, USE 1 STRIP TO CHECK GLUCOSE ONCE DAILY, Disp: , Rfl:     ibuprofen (MOTRIN) 200 mg tablet, Take 200 mg by mouth every 6 (six) hours as needed, Disp: , Rfl:     meloxicam (MOBIC) 15 mg tablet, Take 15 mg by mouth if needed, Disp: , Rfl:     metFORMIN (GLUCOPHAGE-XR) 500 mg 24 hr tablet, Take 500 mg by mouth daily with breakfast, Disp: , Rfl:     metoprolol succinate (TOPROL-XL) 50 mg 24 hr tablet, Take 50 mg by mouth daily, Disp: , Rfl:     Multiple Vitamins-Minerals (CENTRUM SILVER PO), Take by mouth, Disp: , Rfl:     mupirocin (BACTROBAN) 2 % ointment, Apply topically daily, Disp: 30 g, Rfl: 1    olmesartan-hydrochlorothiazide (BENICAR HCT) 20-12.5 MG per tablet, Take 1 tablet by mouth daily, Disp: , Rfl:   No current facility-administered medications for this visit.  No family history on file.   Review of Systems  Constitutional:  Negative for chills and fever.   Respiratory:  Negative for chest tightness and shortness of breath.    Cardiovascular:  Positive for leg swelling.   Musculoskeletal:  Positive for arthralgias, back pain and gait problem.   Skin:  Positive for wound.     Objective:  /80   Pulse 78   Temp (!) 97.4 °F (36.3 °C)   Resp 18     Physical Exam  Constitutional:       General: She is not in acute distress.     Appearance: She is not ill-appearing.   Cardiovascular:      Comments: Left DP pulse 1/4, PT pulse 0/4  CRT less than 3 seconds  Absent hair growth to distal  digits  Mild nonpitting edema to bilateral lower extremities  Pulmonary:      Effort: No respiratory distress.   Musculoskeletal:      Comments: MMT 5/5 at level of ankle bilaterally  No pain with palpitation of posterior calves bilaterally   Skin:     Comments: Left anterior lateral leg traumatic wound with fibrogranular base.  Less fibrotic tissue.  Trace erythema in periwound area, improved since last exam.  No significant localized edema.  Mild tenderness with direct palpation.  No crepitus, no fluctuance, no malodor.  Mild serosanguineous drainage      Right lower extremity distal anterior lateral traumatic wound.  Fibrotic base.  Surrounding inflammation without acute erythema.  No significant edema.  Nontender with palpation.  Trace serous drainage expressed.  No purulence expressed, no crepitus, no fluctuance, no malodor        Neurological:      Comments: Gross sensation intact to bilateral feet     Wound 01/09/25 Traumatic Leg Left;Lower;Lateral (Active)   Wound Image   02/25/25 1525   Wound Description Granulation tissue 02/25/25 1531   Sabrina-wound Assessment Scar Tissue;Dry;Scaly 02/25/25 1531   Wound Length (cm) 1.3 cm 02/25/25 1531   Wound Width (cm) 0.8 cm 02/25/25 1531   Wound Depth (cm) 0.1 cm 02/25/25 1531   Wound Surface Area (cm^2) 1.04 cm^2 02/25/25 1531   Wound Volume (cm^3) 0.104 cm^3 02/25/25 1531   Calculated Wound Volume (cm^3) 0.1 cm^3 02/25/25 1531   Change in Wound Size % 58.33 02/25/25 1531   Drainage Amount Small 02/25/25 1531   Drainage Description Serosanguineous 02/25/25 1531   Non-staged Wound Description Full thickness 02/25/25 1531       Wound 02/18/25 Leg Right;Lateral;Lower (Active)   Wound Image   02/25/25 1551   Wound Description Pink;Slough;Yellow 02/25/25 1532   Sabrina-wound Assessment Edema;Pink;Scaly;Dry 02/25/25 1532   Wound Length (cm) 0.4 cm 02/25/25 1532   Wound Width (cm) 0.4 cm 02/25/25 1532   Wound Depth (cm) 0.1 cm 02/25/25 1532   Wound Surface Area (cm^2) 0.16 cm^2  "02/25/25 1532   Wound Volume (cm^3) 0.016 cm^3 02/25/25 1532   Calculated Wound Volume (cm^3) 0.02 cm^3 02/25/25 1532   Change in Wound Size % 60 02/25/25 1532   Drainage Amount Small 02/25/25 1532   Drainage Description Serosanguineous 02/25/25 1532   Non-staged Wound Description Full thickness 02/25/25 1532                         Debridement   Wound 01/09/25 Traumatic Leg Left;Lower;Lateral    Universal Protocol:  procedure performed by consultantConsent: Verbal consent obtained.  Risks and benefits: risks, benefits and alternatives were discussed  Consent given by: patient  Time out: Immediately prior to procedure a \"time out\" was called to verify the correct patient, procedure, equipment, support staff and site/side marked as required.  Patient understanding: patient states understanding of the procedure being performed  Patient identity confirmed: verbally with patient    Debridement Details  Performed by: physician  Debridement type: selective  Pain control: lidocaine 4%      Post-debridement measurements  Length (cm): 1.3  Width (cm): 0.8  Depth (cm): 0.1  Percent debrided: 100%  Surface Area (cm^2): 1.04  Area Debrided (cm^2): 1.04  Volume (cm^3): 0.1    Devitalized tissue debrided: callus and fibrin  Instrument(s) utilized: curette  Technique utilized: nonexcisionalBleeding: medium  Hemostasis obtained with: pressure  Procedural pain (0-10): 0  Post-procedural pain: 0   Response to treatment: procedure was tolerated well    Debridement   Wound 02/18/25 Leg Right;Lateral;Lower    Universal Protocol:  procedure performed by consultantConsent: Verbal consent obtained.  Risks and benefits: risks, benefits and alternatives were discussed  Consent given by: patient  Time out: Immediately prior to procedure a \"time out\" was called to verify the correct patient, procedure, equipment, support staff and site/side marked as required.  Patient understanding: patient states understanding of the procedure being " performed  Patient identity confirmed: verbally with patient    Debridement Details  Performed by: physician  Debridement type: surgical  Level of debridement: subcutaneous tissue  Pain control: lidocaine 4%      Post-debridement measurements  Length (cm): 0.8  Width (cm): 0.8  Depth (cm): 0.2  Percent debrided: 100%  Surface Area (cm^2): 0.64  Area Debrided (cm^2): 0.64  Volume (cm^3): 0.13    Tissue and other material debrided: subcutaneous tissue  Devitalized tissue debrided: fibrin  Instrument(s) utilized: curette  Technique utilized: nonexcisionalBleeding: medium  Hemostasis obtained with: pressure  Procedural pain (0-10): 0  Post-procedural pain: 0   Response to treatment: procedure was tolerated well                 Wound Instructions:  Orders Placed This Encounter   Procedures    Wound cleansing and dressings Traumatic Left;Lower;Lateral Leg     Wound cleansing and dressings Traumatic Left;Lower       Wash your hands with soap and water.  Remove old dressing, discard into plastic bag and place in trash.  Cleanse the wound with mild soap and water  prior to applying a clean dressing. Do not use tissue or cotton balls. Do not scrub the wound. Pat dry using gauze.  Shower no do not get dressings wet may cover with cast cover purchase at [employer] or [employer]      Apply Polymen silver  to the left leg wound.  Cover with gauze  Secure with roel and tape  Change dressing daily  Apply Spandagrip F      Elastic Tubular Stocking: Spandagrip F     Tubular elastic bandage: Apply from base of toes to behind the knee. Apply in AM, may remove for sleep.     Avoid prolonged standing in one place.     Elevate leg(s) above the level of the heart when sitting or as much as possible.        Please try to consume 3-4 servings of protein (30g) each day.   - Each serving of protein should contain about 30 mg protein.   - Good sources of protein include, lean meats (fish, chicken, etc), eggs, dairy products (yogurt, cheese), tofu,  "legumes (chickpeas, lentils, peas, black beans), nuts (cashew, walnut, peanuts, etc), & quinoa.       If you develop fever, chills, nausea or vomiting, increase in drainage or foul smelling drainage go to the closest emergency department for evaluation.       In case of inclement weather, please call [phone] to verify appointment or notify us at your earliest convienment to reschedule or cancel. Thank you.       Follow up in 1 week     Standing Status:   Future     Expected Date:   3/4/2025     Expiration Date:   3/4/2025    Wound cleansing and dressings     Wound cleansing and dressings Right;Lateral;Lower Leg      Wash your hands with soap and water.  Remove old dressing, discard into plastic bag and place in trash.    Cleanse the wound with mild soap (dove) and water prior to applying a clean dressing. Do not use tissue or cotton balls.   Do not scrub the wound. Pat dry using gauze.  Shower yes      Apply medi honey then adaptic to the Right Lower leg wound.  Cover with gauze  Secure with roel  Change dressing daily   Apply spandagrib size F     Elastic Tubular Stocking     Tubular elastic bandage: Apply from base of toes to behind the knee. Apply in AM, may remove for sleep.     Avoid prolonged standing in one place.     Elevate leg(s) above the level of the heart when sitting or as much as possible.     Standing Status:   Future     Expected Date:   3/4/2025     Expiration Date:   3/4/2025    Wound Procedure Treatment Traumatic Left;Lower;Lateral Leg     This order was created via procedure documentation    Wound Procedure Treatment Right;Lateral;Lower Leg     This order was created via procedure documentation    Debridement     This order was created via procedure documentation    Debridement     This order was created via procedure documentation         Jessica Azevedo DPM      Portions of the record may have been created with voice recognition software. Occasional wrong word or \"sound a like\" " substitutions may have occurred due to the inherent limitations of voice recognition software. Read the chart carefully and recognize, using context, where substitutions have occurred.

## 2025-02-25 NOTE — PROGRESS NOTES
Wound Procedure Treatment Right;Lateral;Lower Leg    Performed by: Antonia Conrad RN  Authorized by: Jessica Azevedo DPM  Associated wounds:   Wound 02/18/25 Leg Right;Lateral;Lower    Wound cleansed with:  NSS   Applied topical:  Medihoney gel   Applied primary dressing:  Non adherent contact layer   Applied secondary dressing:  Gauze   Dressing secured with:  Tu, Tape and Elastic tubular stocking

## 2025-02-25 NOTE — PROGRESS NOTES
Wound Procedure Treatment Traumatic Left;Lower;Lateral Leg    Performed by: Antonia Conrad RN  Authorized by: Jessica Azevedo DPM  Associated wounds:   Wound 01/09/25 Traumatic Leg Left;Lower;Lateral    Wound cleansed with:  NSS   Applied primary dressing:  Silver and Polymem foam   Applied secondary dressing:  Gauze   Dressing secured with:  Tu, Tape, Elastic tubular stocking and Size F

## 2025-02-25 NOTE — PATIENT INSTRUCTIONS
Orders Placed This Encounter   Procedures    Wound cleansing and dressings Traumatic Left;Lower;Lateral Leg     Wound cleansing and dressings Traumatic Left;Lower       Wash your hands with soap and water.  Remove old dressing, discard into plastic bag and place in trash.  Cleanse the wound with mild soap and water  prior to applying a clean dressing. Do not use tissue or cotton balls. Do not scrub the wound. Pat dry using gauze.  Shower no do not get dressings wet may cover with cast cover purchase at [employer] or [employer]      Apply Polymen silver  to the left leg wound.  Cover with gauze  Secure with roel and tape  Change dressing daily  Apply Spandagrip F      Elastic Tubular Stocking: Spandagrip F     Tubular elastic bandage: Apply from base of toes to behind the knee. Apply in AM, may remove for sleep.     Avoid prolonged standing in one place.     Elevate leg(s) above the level of the heart when sitting or as much as possible.        Please try to consume 3-4 servings of protein (30g) each day.   - Each serving of protein should contain about 30 mg protein.   - Good sources of protein include, lean meats (fish, chicken, etc), eggs, dairy products (yogurt, cheese), tofu, legumes (chickpeas, lentils, peas, black beans), nuts (cashew, walnut, peanuts, etc), & quinoa.       If you develop fever, chills, nausea or vomiting, increase in drainage or foul smelling drainage go to the closest emergency department for evaluation.       In case of inclement weather, please call [phone] to verify appointment or notify us at your earliest convienment to reschedule or cancel. Thank you.       Follow up in 1 week     Standing Status:   Future     Expected Date:   3/4/2025     Expiration Date:   3/4/2025    Wound cleansing and dressings     Wound cleansing and dressings Right;Lateral;Lower Leg      Wash your hands with soap and water.  Remove old dressing, discard into plastic bag and place in trash.    Cleanse the wound  with mild soap (dove) and water prior to applying a clean dressing. Do not use tissue or cotton balls.   Do not scrub the wound. Pat dry using gauze.  Shower yes      Apply medi honey then adaptic to the Right Lower leg wound.  Cover with gauze  Secure with roel  Change dressing daily   Apply spandagrib size F     Elastic Tubular Stocking     Tubular elastic bandage: Apply from base of toes to behind the knee. Apply in AM, may remove for sleep.     Avoid prolonged standing in one place.     Elevate leg(s) above the level of the heart when sitting or as much as possible.     Standing Status:   Future     Expected Date:   3/4/2025     Expiration Date:   3/4/2025    Wound Procedure Treatment Traumatic Left;Lower;Lateral Leg     This order was created via procedure documentation    Wound Procedure Treatment Right;Lateral;Lower Leg     This order was created via procedure documentation

## 2025-03-04 ENCOUNTER — OFFICE VISIT (OUTPATIENT)
Dept: WOUND CARE | Facility: CLINIC | Age: 80
End: 2025-03-04
Payer: MEDICARE

## 2025-03-04 VITALS
RESPIRATION RATE: 16 BRPM | SYSTOLIC BLOOD PRESSURE: 136 MMHG | HEART RATE: 80 BPM | DIASTOLIC BLOOD PRESSURE: 74 MMHG | TEMPERATURE: 97.3 F

## 2025-03-04 DIAGNOSIS — S81.802D TRAUMATIC OPEN WOUND OF LEFT LOWER LEG WITH DELAYED HEALING: Primary | ICD-10-CM

## 2025-03-04 DIAGNOSIS — S81.801A TRAUMATIC OPEN WOUND OF RIGHT LOWER LEG, INITIAL ENCOUNTER: ICD-10-CM

## 2025-03-04 PROCEDURE — 97597 DBRDMT OPN WND 1ST 20 CM/<: CPT | Performed by: PODIATRIST

## 2025-03-04 RX ORDER — LIDOCAINE 40 MG/G
CREAM TOPICAL ONCE
Status: COMPLETED | OUTPATIENT
Start: 2025-03-04 | End: 2025-03-04

## 2025-03-04 RX ADMIN — LIDOCAINE: 40 CREAM TOPICAL at 13:03

## 2025-03-04 NOTE — PROGRESS NOTES
Patient ID: Kim Kwon is a 79 y.o. female Date of Birth 1945       Chief Complaint   Patient presents with    Follow Up Wound Care Visit     BLLE wounds        Allergies:  Codeine    Diagnosis:  1. Traumatic open wound of left lower leg with delayed healing  -     lidocaine (LMX) 4 % cream  -     Wound cleansing and dressings Traumatic Left;Lower;Lateral Leg; Future; Expected date: 03/18/2025  -     Wound Procedure Treatment Traumatic Left;Lower;Lateral Leg  2. Traumatic open wound of right lower leg, initial encounter  -     lidocaine (LMX) 4 % cream  -     Wound cleansing and dressings Right;Lateral;Lower Leg; Future; Expected date: 03/18/2025  -     Wound Procedure Treatment Right;Lateral;Lower Leg     Diagnosis ICD-10-CM Associated Orders   1. Traumatic open wound of left lower leg with delayed healing  S81.802D lidocaine (LMX) 4 % cream     Wound cleansing and dressings Traumatic Left;Lower;Lateral Leg     Wound Procedure Treatment Traumatic Left;Lower;Lateral Leg      2. Traumatic open wound of right lower leg, initial encounter  S81.801A lidocaine (LMX) 4 % cream     Wound cleansing and dressings Right;Lateral;Lower Leg     Wound Procedure Treatment Right;Lateral;Lower Leg           Assessment & Plan:  See wound orders.    (Left silver alginate, right medihoney and Mepilex)     -Left leg lateral traumatic wound, continues to decrease in size.  Progression is slow however wound base has significantly improved over course of treatment.  No SOI.  Selective debridement, changed to Adaptic and silver alginate  -Right distal anterior lateral leg wound. Fibrotic slough .  Selective debridement.  Medihoney DSD  -DM2 A1c 6.6% 12/2024  -Patient counseled on importance of lower extremity elevation for decrease in edema  -Continue Spendagrip compression bilaterally to help with lower extremity edema  -Patient counseled on importance of strict glycemic control and high-protein diet  -Patient counseled on clinical  signs of infection present to the ED if these occur  -Follow-up 1 week        -Vas arterial studies completed at [facility]:No hemodynamically significant right or left lower extremity arterial disease.   RIGHT Biphasic flow right common femoral artery. Biphasic flow profunda femoris   artery. Biphasic flow throughout the superficial femoral artery. Biphasic flow   popliteal artery. Biphasic flow distal posterior tibial artery. Biphasic flow   dorsalis pedis.   LEFT there is biphasic flow in the external iliac artery. There is   biphasic to triphasic flow common femoral artery. Biphasic flow profunda femoris   artery. Biphasic flow is noted throughout the superficial femoral artery.   Biphasic flow popliteal artery. Biphasic flow distal posterior tibial artery.   Biphasic flow dorsalis pedis.     Subjective:   HPI   3/4/2025 patient presents for continued evaluation of left leg traumatic wound and right leg wound.  Patient states she has been adherent to dressing changes.  She denies any increase in redness or swelling.  She denies any changes in drainage.  Patient states she has been adherent to wound care.  She denies nausea, vomiting, fever, chills, shortness of breath    2/25/2025 patient presents for evaluation management of left anterior lateral leg wound and right distal lateral leg wound.  Patient has been adherent with dressing changes and compression.  She denies any increase in pain, swelling, or drainage.  She denies any nausea, vomiting, fever, chills, shortness of breath     2/18/2025 patient presents for evaluation and management of left anterior lateral leg wound of traumatic origin.  She has been adherent to wound care.  She denies any redness, swelling, drainage changes, or pain.  Patient reports a new wound on the right anterior lateral leg.  She states she hit her leg over a month ago and a scab had been present.  The scab fell off with underlying wound being noted.  She states the area has mild  inflammation but she denies any purulence, swelling, or significant pain.  Denies nausea, vomiting, fever, chills, shortness of breath     2/4/2025 patient presents for further evaluation and management of left anterior lateral leg wound of traumatic origin.  Patient has been compliant with dressing changes, compression, and elevation.  She denies any pain, swelling, redness in the area.  She denies any systemic signs of infection such as nausea, vomiting, fever, chills, shortness of breath     1/16/2025 patient presents today for further evaluation and management of left anterior lateral leg traumatic wound sustained in October with delayed healing.  Patient has been adherent to wound care.  She denies any significant changes in wound.  Today she completes her oral doxycycline that was prescribed by her PCP.  Denies any clinical signs of infection.  Patient states she had her vascular test performed yesterday afternoon however she has not received the results and I have not received results.  Patient denies nausea, vomiting, fever, chills, shortness of breath        1/9/2025 Patient presents for evaluation and management of left anterior leg traumatic wound delayed healing.  Injury was sustained in October and has been managed by her primary care physician.  She states the area will begin to heal and then declined.  She reports mild to moderate drainage, serosanguineous.  Patient reports intermittent pain in the area of wound with activity and while resting.  She denies any significant swelling or redness at the site of injury however has mild bilateral lower extremity swelling.  Patient initially was using nonstick dressing over the area after cleaning with warm soapy water.  Recently she was advised to leave the area open to air.  She had a slight increase in redness in the area and was prescribed doxycycline by her primary care physician on 1/7/2025 and referred to wound care for further evaluation and  management.  Patient denies nausea, vomiting, fever, chills, shortness of breath    The following portions of the patient's history were reviewed and updated as appropriate:   There is no problem list on file for this patient.    Past Medical History:   Diagnosis Date    Diabetes mellitus (HCC)     Diverticulitis     GERD (gastroesophageal reflux disease)     Hypertension      Past Surgical History:   Procedure Laterality Date    CHOLECYSTECTOMY      TONSILLECTOMY       Social History     Socioeconomic History    Marital status:      Spouse name: Not on file    Number of children: Not on file    Years of education: Not on file    Highest education level: Not on file   Occupational History    Not on file   Tobacco Use    Smoking status: Never    Smokeless tobacco: Never   Vaping Use    Vaping status: Never Used   Substance and Sexual Activity    Alcohol use: Not on file    Drug use: Not on file    Sexual activity: Not on file   Other Topics Concern    Not on file   Social History Narrative    Not on file     Social Drivers of Health     Financial Resource Strain: Low Risk  (8/23/2023)    Received from [facility]    Overall Financial Resource Strain (CARDIA)     Difficulty of Paying Living Expenses: Not hard at all   Food Insecurity: No Food Insecurity (8/23/2023)    Received from [facility]    Hunger Vital Sign     Worried About Running Out of Food in the Last Year: Never true     Ran Out of Food in the Last Year: Never true   Transportation Needs: No Transportation Needs (8/23/2023)    Received from [facility]    PRAPARE - Transportation     Lack of Transportation (Medical): No     Lack of Transportation (Non-Medical): No   Physical Activity: Not on file   Stress: No Stress Concern Present (8/23/2023)    Received from [facility]    [employer] [place] [employer] - Occupational Stress Questionnaire     Feeling of Stress : Not at  all   Social Connections: Unknown (6/18/2024)    Received from [facility] [employer]     How often do you feel lonely or isolated from those around you? (Adult - for ages 18 years and over): Not on file   Intimate Partner Violence: Not At Risk (8/23/2023)    Received from [facility]    Humiliation, Afraid, Rape, and Kick questionnaire     Fear of Current or Ex-Partner: No     Emotionally Abused: No     Physically Abused: No     Sexually Abused: No   Housing Stability: Not on file        Current Outpatient Medications:     Ascorbic Acid (vitamin C) 100 MG tablet, Take 100 mg by mouth daily, Disp: , Rfl:     aspirin (ECOTRIN LOW STRENGTH) 81 mg EC tablet, Take 81 mg by mouth daily, Disp: , Rfl:     Blood Glucose Monitoring Suppl (FreeStyle Lite) w/Device KIT, USE AS DIRECTED E11.9, Disp: , Rfl:     Cholecalciferol 25 MCG (1000 UT) capsule, Take 5,000 Units by mouth daily, Disp: , Rfl:     esomeprazole (NexIUM) 40 MG capsule, Take 40 mg by mouth if needed, Disp: , Rfl:     FREESTYLE LITE test strip, USE 1 STRIP TO CHECK GLUCOSE ONCE DAILY, Disp: , Rfl:     ibuprofen (MOTRIN) 200 mg tablet, Take 200 mg by mouth every 6 (six) hours as needed, Disp: , Rfl:     meloxicam (MOBIC) 15 mg tablet, Take 15 mg by mouth if needed, Disp: , Rfl:     metFORMIN (GLUCOPHAGE-XR) 500 mg 24 hr tablet, Take 500 mg by mouth daily with breakfast, Disp: , Rfl:     metoprolol succinate (TOPROL-XL) 50 mg 24 hr tablet, Take 50 mg by mouth daily, Disp: , Rfl:     Multiple Vitamins-Minerals (CENTRUM SILVER PO), Take by mouth, Disp: , Rfl:     mupirocin (BACTROBAN) 2 % ointment, Apply topically daily, Disp: 30 g, Rfl: 1    olmesartan-hydrochlorothiazide (BENICAR HCT) 20-12.5 MG per tablet, Take 1 tablet by mouth daily, Disp: , Rfl:   No current facility-administered medications for this visit.  No family history on file.   Review of Systems  Constitutional:  Negative for chills and fever.   Respiratory:  Negative for  chest tightness and shortness of breath.    Cardiovascular:  Positive for leg swelling.   Musculoskeletal:  Positive for arthralgias, back pain and gait problem.   Skin:  Positive for wound.     Objective:  /74   Pulse 80   Temp (!) 97.3 °F (36.3 °C)   Resp 16     Physical Exam  Constitutional:       General: She is not in acute distress.     Appearance: She is not ill-appearing.   Cardiovascular:      Comments: Left DP pulse 1/4, PT pulse 0/4  CRT less than 3 seconds  Absent hair growth to distal digits  Mild nonpitting edema to bilateral lower extremities  Pulmonary:      Effort: No respiratory distress.   Musculoskeletal:      Comments: MMT 5/5 at level of ankle bilaterally  No pain with palpitation of posterior calves bilaterally   Skin:     Comments: Left anterior lateral leg traumatic wound with fibrogranular base.  Primarily granular.  No significant erythema.  No significant localized edema.  Mild tenderness with direct palpation.  No crepitus, no fluctuance, no malodor.  Mild serosanguineous drainage      Right lower extremity distal anterior lateral traumatic wound.  Fibrotic base.  Surrounding inflammation without acute erythema.  No significant edema.  Nontender with palpation.  Trace serous drainage expressed.  No purulence expressed, no crepitus, no fluctuance, no malodor      Wound 01/09/25 Traumatic Leg Left;Lower;Lateral (Active)   Wound Image   03/04/25 1259   Wound Description Hypergranulation;Yellow;Slough 03/04/25 1303   Sabrina-wound Assessment Dry;Scaly;Brown 03/04/25 1303   Wound Length (cm) 1 cm 03/04/25 1303   Wound Width (cm) 0.7 cm 03/04/25 1303   Wound Depth (cm) 0.1 cm 03/04/25 1303   Wound Surface Area (cm^2) 0.7 cm^2 03/04/25 1303   Wound Volume (cm^3) 0.07 cm^3 03/04/25 1303   Calculated Wound Volume (cm^3) 0.07 cm^3 03/04/25 1303   Change in Wound Size % 70.83 03/04/25 1303   Drainage Amount Small 03/04/25 1303   Drainage Description Serosanguineous 03/04/25 1303   Non-staged  "Wound Description Full thickness 03/04/25 1303       Wound 02/18/25 Leg Right;Lateral;Lower (Active)   Wound Image   03/04/25 1259   Wound Description Brown;Eschar;Pink;Yellow;Slough 03/04/25 1302   Sabrina-wound Assessment Dry;Scaly 03/04/25 1302   Wound Length (cm) 0.2 cm 03/04/25 1302   Wound Width (cm) 0.3 cm 03/04/25 1302   Wound Depth (cm) 0.1 cm 03/04/25 1302   Wound Surface Area (cm^2) 0.06 cm^2 03/04/25 1302   Wound Volume (cm^3) 0.006 cm^3 03/04/25 1302   Calculated Wound Volume (cm^3) 0.01 cm^3 03/04/25 1302   Change in Wound Size % 80 03/04/25 1302   Drainage Amount Small 03/04/25 1302   Drainage Description Serosanguineous 03/04/25 1302   Non-staged Wound Description Full thickness 03/04/25 1302                         Debridement   Wound 02/18/25 Leg Right;Lateral;Lower    Universal Protocol:  procedure performed by consultantConsent: Verbal consent obtained.  Risks and benefits: risks, benefits and alternatives were discussed  Consent given by: patient  Time out: Immediately prior to procedure a \"time out\" was called to verify the correct patient, procedure, equipment, support staff and site/side marked as required.  Patient understanding: patient states understanding of the procedure being performed  Patient identity confirmed: verbally with patient    Debridement Details  Performed by: physician  Debridement type: selective  Pain control: lidocaine 4%      Post-debridement measurements  Length (cm): 0.2  Width (cm): 0.3  Depth (cm): 0.1  Percent debrided: 100%  Surface Area (cm^2): 0.06  Area Debrided (cm^2): 0.06  Volume (cm^3): 0.01    Devitalized tissue debrided: fibrin and slough  Instrument(s) utilized: curette  Technique utilized: nonexcisionalBleeding: small  Hemostasis obtained with: pressure  Procedural pain (0-10): 2  Post-procedural pain: 0   Response to treatment: procedure was tolerated well    Debridement   Wound 02/18/25 Leg Right;Lateral;Lower    Universal Protocol:  procedure performed " "by consultantConsent: Verbal consent obtained.  Risks and benefits: risks, benefits and alternatives were discussed  Consent given by: patient  Time out: Immediately prior to procedure a \"time out\" was called to verify the correct patient, procedure, equipment, support staff and site/side marked as required.  Patient understanding: patient states understanding of the procedure being performed  Patient identity confirmed: verbally with patient    Debridement Details  Performed by: physician  Debridement type: selective  Pain control: lidocaine 4%      Post-debridement measurements  Length (cm): 1  Width (cm): 0.7  Depth (cm): 0.1  Percent debrided: 50%  Surface Area (cm^2): 0.7  Area Debrided (cm^2): 0.35  Volume (cm^3): 0.07    Devitalized tissue debrided: slough  Instrument(s) utilized: curette  Technique utilized: nonexcisionalBleeding: small  Hemostasis obtained with: pressure  Procedural pain (0-10): 2  Post-procedural pain: 0   Response to treatment: procedure was tolerated well                 Wound Instructions:  Orders Placed This Encounter   Procedures    Wound cleansing and dressings Traumatic Left;Lower;Lateral Leg     Traumatic Left;Lower                                     Wash your hands with soap and water.  Remove old dressing, discard into plastic bag and place in trash.  Cleanse the wound with mild soap and water  prior to applying a clean dressing. Do not use tissue or cotton balls. Do not scrub the wound. Pat dry using gauze.  Shower no do not get dressings wet may cover with cast cover purchase at pharmacy or [employer]      Apply adaptic and then silver alginate to the left leg wound.  Cover with gauze  Secure with roel and tape  Change dressing every other day  Apply Spandagrip F      Elastic Tubular Stocking: Spandagrip F     Tubular elastic bandage: Apply from base of toes to behind the knee. Apply in AM, may remove for sleep.     Avoid prolonged standing in one place.     Elevate leg(s) above " the level of the heart when sitting or as much as possible.        Please try to consume 3-4 servings of protein (30g) each day.   - Each serving of protein should contain about 30 mg protein.   - Good sources of protein include, lean meats (fish, chicken, etc), eggs, dairy products (yogurt, cheese), tofu, legumes (chickpeas, lentils, peas, black beans), nuts (cashew, walnut, peanuts, etc), & quinoa.       If you develop fever, chills, nausea or vomiting, increase in drainage or foul smelling drainage go to the closest emergency department for evaluation.       In case of inclement weather, please call [phone] to verify appointment or notify us at your earliest convienment to reschedule or cancel. Thank you.       Follow up in 2 weeks     Standing Status:   Future     Expected Date:   3/18/2025     Expiration Date:   3/18/2025    Wound cleansing and dressings Right;Lateral;Lower Leg     Right;Lateral;Lower Leg         Wash your hands with soap and water.  Remove old dressing, discard into plastic bag and place in trash.    Cleanse the wound with mild soap (dove) and water prior to applying a clean dressing. Do not use tissue or cotton balls.   Do not scrub the wound. Pat dry using gauze.  Shower yes      Apply medi honey then adaptic to the Right Lower leg wound.  Cover with silicon bordered foam (or Band-Aid if silicon bordered foam not available)   Change dressing daily  Apply spandagrib size F      Elastic Tubular Stocking     Tubular elastic bandage: Apply from base of toes to behind the knee. Apply in AM, may remove for sleep.     Avoid prolonged standing in one place.     Elevate leg(s) above the level of the heart when sitting or as much as possible.     Standing Status:   Future     Expected Date:   3/18/2025     Expiration Date:   3/18/2025    Wound Procedure Treatment Traumatic Left;Lower;Lateral Leg     This order was created via procedure documentation    Wound Procedure Treatment  "Right;Lateral;Lower Leg     This order was created via procedure documentation         Jessica Azevedo DPM      Portions of the record may have been created with voice recognition software. Occasional wrong word or \"sound a like\" substitutions may have occurred due to the inherent limitations of voice recognition software. Read the chart carefully and recognize, using context, where substitutions have occurred.      "

## 2025-03-04 NOTE — PROGRESS NOTES
Wound Procedure Treatment Traumatic Left;Lower;Lateral Leg    Performed by: Shae Espinal RN  Authorized by: Jessica Azevedo DPM  Associated wounds:   Wound 01/09/25 Traumatic Leg Left;Lower;Lateral    Wound cleansed with:  NSS   Applied primary dressing:  Non adherent contact layer, Silver and Calcium alginate   Applied secondary dressing:  Gauze   Dressing secured with:  Tu, Tape, Elastic tubular stocking and Size F

## 2025-03-04 NOTE — PATIENT INSTRUCTIONS
Orders Placed This Encounter   Procedures    Wound cleansing and dressings Traumatic Left;Lower;Lateral Leg     Traumatic Left;Lower                                     Wash your hands with soap and water.  Remove old dressing, discard into plastic bag and place in trash.  Cleanse the wound with mild soap and water  prior to applying a clean dressing. Do not use tissue or cotton balls. Do not scrub the wound. Pat dry using gauze.  Shower no do not get dressings wet may cover with cast cover purchase at [employer] or [email or web address]      Apply adaptic and then silver alginate to the left leg wound.  Cover with gauze  Secure with roel and tape  Change dressing every other day  Apply Spandagrip F      Elastic Tubular Stocking: Spandagrip F     Tubular elastic bandage: Apply from base of toes to behind the knee. Apply in AM, may remove for sleep.     Avoid prolonged standing in one place.     Elevate leg(s) above the level of the heart when sitting or as much as possible.        Please try to consume 3-4 servings of protein (30g) each day.   - Each serving of protein should contain about 30 mg protein.   - Good sources of protein include, lean meats (fish, chicken, etc), eggs, dairy products (yogurt, cheese), tofu, legumes (chickpeas, lentils, peas, black beans), nuts (cashew, walnut, peanuts, etc), & quinoa.       If you develop fever, chills, nausea or vomiting, increase in drainage or foul smelling drainage go to the closest emergency department for evaluation.       In case of inclement weather, please call [phone] to verify appointment or notify us at your earliest convienment to reschedule or cancel. Thank you.       Follow up in 2 weeks     Standing Status:   Future     Expected Date:   3/18/2025     Expiration Date:   3/18/2025    Wound cleansing and dressings Right;Lateral;Lower Leg     Right;Lateral;Lower Leg         Wash your hands with soap and water.  Remove old dressing, discard into plastic bag and place in  trash.    Cleanse the wound with mild soap (dove) and water prior to applying a clean dressing. Do not use tissue or cotton balls.   Do not scrub the wound. Pat dry using gauze.  Shower yes      Apply medi honey then adaptic to the Right Lower leg wound.  Cover with silicon bordered foam (or Band-Aid if silicon bordered foam not available)   Change dressing daily  Apply spandagrib size F      Elastic Tubular Stocking     Tubular elastic bandage: Apply from base of toes to behind the knee. Apply in AM, may remove for sleep.     Avoid prolonged standing in one place.     Elevate leg(s) above the level of the heart when sitting or as much as possible.     Standing Status:   Future     Expected Date:   3/18/2025     Expiration Date:   3/18/2025

## 2025-03-04 NOTE — PROGRESS NOTES
Wound Procedure Treatment Right;Lateral;Lower Leg    Performed by: Shae Espinal RN  Authorized by: Jessica Azevedo DPM  Associated wounds:   Wound 02/18/25 Leg Right;Lateral;Lower    Wound cleansed with:  NSS   Applied topical:  Medihoney gel   Applied primary dressing:  Non adherent contact layer and Silicone bordered foam

## 2025-03-18 ENCOUNTER — OFFICE VISIT (OUTPATIENT)
Dept: WOUND CARE | Facility: CLINIC | Age: 80
End: 2025-03-18
Payer: MEDICARE

## 2025-03-18 VITALS
DIASTOLIC BLOOD PRESSURE: 70 MMHG | TEMPERATURE: 96.8 F | RESPIRATION RATE: 18 BRPM | SYSTOLIC BLOOD PRESSURE: 138 MMHG | HEART RATE: 78 BPM

## 2025-03-18 DIAGNOSIS — S81.801A TRAUMATIC OPEN WOUND OF RIGHT LOWER LEG, INITIAL ENCOUNTER: ICD-10-CM

## 2025-03-18 DIAGNOSIS — S81.802D TRAUMATIC OPEN WOUND OF LEFT LOWER LEG WITH DELAYED HEALING: Primary | ICD-10-CM

## 2025-03-18 PROCEDURE — 88305 TISSUE EXAM BY PATHOLOGIST: CPT | Performed by: PATHOLOGY

## 2025-03-18 PROCEDURE — 97597 DBRDMT OPN WND 1ST 20 CM/<: CPT | Performed by: PODIATRIST

## 2025-03-18 PROCEDURE — 11104 PUNCH BX SKIN SINGLE LESION: CPT | Performed by: PODIATRIST

## 2025-03-18 RX ORDER — LIDOCAINE 40 MG/G
CREAM TOPICAL ONCE
Status: COMPLETED | OUTPATIENT
Start: 2025-03-18 | End: 2025-03-18

## 2025-03-18 RX ADMIN — LIDOCAINE 1 APPLICATION: 40 CREAM TOPICAL at 13:11

## 2025-03-18 NOTE — PROGRESS NOTES
Wound Procedure Treatment    Performed by: Hilary Garg RN  Authorized by: Jessica Azevedo DPM  Associated wounds:   Wound 01/09/25 Traumatic Leg Left;Lower;Lateral  Wound 02/18/25 Leg Right;Lateral;Lower    Wound cleansed with:  NSS   Applied primary dressing:  Collagen dressing   Applied secondary dressing:  Gauze   Dressing secured with:  Tu, Tape, Elastic tubular stocking and Size F   Comments:  Puracol

## 2025-03-18 NOTE — PROGRESS NOTES
Patient ID: Kim Kwon is a 79 y.o. female Date of Birth 1945       Chief Complaint   Patient presents with    Follow Up Wound Care Visit     BLE wounds       Allergies:  Codeine    Diagnosis:  1. Traumatic open wound of left lower leg with delayed healing  -     lidocaine (LMX) 4 % cream  -     Wound cleansing and dressings; Future  -     Wound Procedure Treatment  2. Traumatic open wound of right lower leg, initial encounter  -     lidocaine (LMX) 4 % cream  -     Wound cleansing and dressings; Future  -     Wound Procedure Treatment     Diagnosis ICD-10-CM Associated Orders   1. Traumatic open wound of left lower leg with delayed healing  S81.802D lidocaine (LMX) 4 % cream     Wound cleansing and dressings     Wound Procedure Treatment      2. Traumatic open wound of right lower leg, initial encounter  S81.801A lidocaine (LMX) 4 % cream     Wound cleansing and dressings     Wound Procedure Treatment           Assessment & Plan:  See wound orders.  Puracol bilateral wounds     -Left leg lateral traumatic wound, similar size to last exam.    -LLE wound biopsy performed at today's evaluation due to chronicity of wound and lack of significant improvement.  No SOI.   -Right distal anterior lateral leg wound, measuring larger at today's evaluation.  Discontinue use of Medihoney at today's visit.  -RLE selective debridement performed  -Change wound care to B/L Puracol  -DM2 A1c 6.6% 12/2024  -Patient counseled on importance of lower extremity elevation for decrease in edema  -Continue Spendagrip compression bilaterally to help with lower extremity edema  -Patient counseled on importance of strict glycemic control and high-protein diet  -Patient counseled on clinical signs of infection present to the ED if these occur  -Follow-up 2 weeks, for continued wound care and biopsy review        -Vas arterial studies completed at [facility]:No hemodynamically significant right or left lower extremity arterial disease.   RIGHT  Biphasic flow right common femoral artery. Biphasic flow profunda femoris   artery. Biphasic flow throughout the superficial femoral artery. Biphasic flow   popliteal artery. Biphasic flow distal posterior tibial artery. Biphasic flow   dorsalis pedis.   LEFT there is biphasic flow in the external iliac artery. There is   biphasic to triphasic flow common femoral artery. Biphasic flow profunda femoris   artery. Biphasic flow is noted throughout the superficial femoral artery.   Biphasic flow popliteal artery. Biphasic flow distal posterior tibial artery.   Biphasic flow dorsalis pedis.       Subjective:   HPI  3/18/2025 patient presents for continued evaluation and management of bilateral lower extremity wounds.  Patient states she has been compliant with wound care.  She denies any redness, swelling, or significant drainage.  Patient denies any pain in the area.  She denies any systemic signs of infection such as nausea, vomiting, fever, chills, shortness of breath        3/4/2025 patient presents for continued evaluation of left leg traumatic wound and right leg wound.  Patient states she has been adherent to dressing changes.  She denies any increase in redness or swelling.  She denies any changes in drainage.  Patient states she has been adherent to wound care.  She denies nausea, vomiting, fever, chills, shortness of breath     2/25/2025 patient presents for evaluation management of left anterior lateral leg wound and right distal lateral leg wound.  Patient has been adherent with dressing changes and compression.  She denies any increase in pain, swelling, or drainage.  She denies any nausea, vomiting, fever, chills, shortness of breath     2/18/2025 patient presents for evaluation and management of left anterior lateral leg wound of traumatic origin.  She has been adherent to wound care.  She denies any redness, swelling, drainage changes, or pain.  Patient reports a new wound on the right anterior lateral  leg.  She states she hit her leg over a month ago and a scab had been present.  The scab fell off with underlying wound being noted.  She states the area has mild inflammation but she denies any purulence, swelling, or significant pain.  Denies nausea, vomiting, fever, chills, shortness of breath     2/4/2025 patient presents for further evaluation and management of left anterior lateral leg wound of traumatic origin.  Patient has been compliant with dressing changes, compression, and elevation.  She denies any pain, swelling, redness in the area.  She denies any systemic signs of infection such as nausea, vomiting, fever, chills, shortness of breath     1/16/2025 patient presents today for further evaluation and management of left anterior lateral leg traumatic wound sustained in October with delayed healing.  Patient has been adherent to wound care.  She denies any significant changes in wound.  Today she completes her oral doxycycline that was prescribed by her PCP.  Denies any clinical signs of infection.  Patient states she had her vascular test performed yesterday afternoon however she has not received the results and I have not received results.  Patient denies nausea, vomiting, fever, chills, shortness of breath        1/9/2025 Patient presents for evaluation and management of left anterior leg traumatic wound delayed healing.  Injury was sustained in October and has been managed by her primary care physician.  She states the area will begin to heal and then declined.  She reports mild to moderate drainage, serosanguineous.  Patient reports intermittent pain in the area of wound with activity and while resting.  She denies any significant swelling or redness at the site of injury however has mild bilateral lower extremity swelling.  Patient initially was using nonstick dressing over the area after cleaning with warm soapy water.  Recently she was advised to leave the area open to air.  She had a slight  increase in redness in the area and was prescribed doxycycline by her primary care physician on 1/7/2025 and referred to wound care for further evaluation and management.  Patient denies nausea, vomiting, fever, chills, shortness of breath  The following portions of the patient's history were reviewed and updated as appropriate:   There is no problem list on file for this patient.    Past Medical History:   Diagnosis Date    Diabetes mellitus (HCC)     Diverticulitis     GERD (gastroesophageal reflux disease)     Hypertension      Past Surgical History:   Procedure Laterality Date    CHOLECYSTECTOMY      TONSILLECTOMY       Social History     Socioeconomic History    Marital status:      Spouse name: Not on file    Number of children: Not on file    Years of education: Not on file    Highest education level: Not on file   Occupational History    Not on file   Tobacco Use    Smoking status: Never    Smokeless tobacco: Never   Vaping Use    Vaping status: Never Used   Substance and Sexual Activity    Alcohol use: Not on file    Drug use: Not on file    Sexual activity: Not on file   Other Topics Concern    Not on file   Social History Narrative    Not on file     Social Drivers of Health     Financial Resource Strain: Low Risk  (8/23/2023)    Received from [facility]    Overall Financial Resource Strain (CARDIA)     Difficulty of Paying Living Expenses: Not hard at all   Food Insecurity: No Food Insecurity (8/23/2023)    Received from [facility]    Hunger Vital Sign     Worried About Running Out of Food in the Last Year: Never true     Ran Out of Food in the Last Year: Never true   Transportation Needs: No Transportation Needs (8/23/2023)    Received from [facility]    PRAPARE - Transportation     Lack of Transportation (Medical): No     Lack of Transportation (Non-Medical): No   Physical Activity: Not on file   Stress: No Stress Concern Present (8/23/2023)     Received from [facility]    [employer] - Occupational Stress Questionnaire     Feeling of Stress : Not at all   Social Connections: Unknown (6/18/2024)    Received from [facility]    Social "[employer]     How often do you feel lonely or isolated from those around you? (Adult - for ages 18 years and over): Not on file   Intimate Partner Violence: Not At Risk (8/23/2023)    Received from [facility]    Humiliation, Afraid, Rape, and Kick questionnaire     Fear of Current or Ex-Partner: No     Emotionally Abused: No     Physically Abused: No     Sexually Abused: No   Housing Stability: Not on file        Current Outpatient Medications:     Ascorbic Acid (vitamin C) 100 MG tablet, Take 100 mg by mouth daily, Disp: , Rfl:     aspirin (ECOTRIN LOW STRENGTH) 81 mg EC tablet, Take 81 mg by mouth daily, Disp: , Rfl:     Blood Glucose Monitoring Suppl (FreeStyle Lite) w/Device KIT, USE AS DIRECTED E11.9, Disp: , Rfl:     Cholecalciferol 25 MCG (1000 UT) capsule, Take 5,000 Units by mouth daily, Disp: , Rfl:     esomeprazole (NexIUM) 40 MG capsule, Take 40 mg by mouth if needed, Disp: , Rfl:     FREESTYLE LITE test strip, USE 1 STRIP TO CHECK GLUCOSE ONCE DAILY, Disp: , Rfl:     ibuprofen (MOTRIN) 200 mg tablet, Take 200 mg by mouth every 6 (six) hours as needed, Disp: , Rfl:     meloxicam (MOBIC) 15 mg tablet, Take 15 mg by mouth if needed, Disp: , Rfl:     metFORMIN (GLUCOPHAGE-XR) 500 mg 24 hr tablet, Take 500 mg by mouth daily with breakfast, Disp: , Rfl:     metoprolol succinate (TOPROL-XL) 50 mg 24 hr tablet, Take 50 mg by mouth daily, Disp: , Rfl:     Multiple Vitamins-Minerals (CENTRUM SILVER PO), Take by mouth, Disp: , Rfl:     mupirocin (BACTROBAN) 2 % ointment, Apply topically daily, Disp: 30 g, Rfl: 1    olmesartan-hydrochlorothiazide (BENICAR HCT) 20-12.5 MG per tablet, Take 1 tablet by mouth daily, Disp: , Rfl:   No current facility-administered  "medications for this visit.  No family history on file.   Review of Systems      Objective:  /70   Pulse 78   Temp (!) 96.8 °F (36 °C)   Resp 18     Physical Exam        Wound 01/09/25 Traumatic Leg Left;Lower;Lateral (Active)   Wound Image   03/18/25 1307   Wound Description Hypergranulation 03/18/25 1309   Non-staged Wound Description Full thickness 03/18/25 1309   Wound Length (cm) 1.1 cm 03/18/25 1309   Wound Width (cm) 0.6 cm 03/18/25 1309   Wound Depth (cm) 0.1 cm 03/18/25 1309   Wound Surface Area (cm^2) 0.66 cm^2 03/18/25 1309   Wound Volume (cm^3) 0.066 cm^3 03/18/25 1309   Calculated Wound Volume (cm^3) 0.07 cm^3 03/18/25 1309   Change in Wound Size % 70.83 03/18/25 1309   Drainage Amount Small 03/18/25 1309   Drainage Description Serosanguineous;Tan 03/18/25 1309   Sabrina-wound Assessment Pink 03/18/25 1309       Wound 02/18/25 Leg Right;Lateral;Lower (Active)   Wound Image   03/18/25 1307   Wound Description Yellow;Pink;Slough;Granulation tissue 03/18/25 1308   Non-staged Wound Description Full thickness 03/18/25 1308   Wound Length (cm) 1.2 cm 03/18/25 1308   Wound Width (cm) 1.1 cm 03/18/25 1308   Wound Depth (cm) 0.1 cm 03/18/25 1308   Wound Surface Area (cm^2) 1.32 cm^2 03/18/25 1308   Wound Volume (cm^3) 0.132 cm^3 03/18/25 1308   Calculated Wound Volume (cm^3) 0.13 cm^3 03/18/25 1308   Change in Wound Size % -160 03/18/25 1308   Drainage Amount Moderate 03/18/25 1308   Drainage Description Serosanguineous 03/18/25 1308   Sabrina-wound Assessment Pink;White 03/18/25 1308                         Biopsy    Date/Time: 3/18/2025 1:15 PM    Performed by: Jessica Azevedo DPM  Authorized by: Jessica Azevedo DPM  Universal Protocol:  procedure performed by consultantConsent: Verbal consent obtained.  Risks and benefits: risks, benefits and alternatives were discussed  Consent given by: patient  Time out: Immediately prior to procedure a \"time out\" was called to verify the correct patient, " "procedure, equipment, support staff and site/side marked as required.  Patient understanding: patient states understanding of the procedure being performed  Patient identity confirmed: verbally with patient    Procedure Details - Lesion Biopsy:     Body area:  Lower extremity    Lower extremity location:  L lower leg    Biopsy method: punch biopsy      Biopsy tissue type: skin and subcutaneous    Initial size (mm):  66    Final defect size (mm):  66    Malignancy: malignancy unknown    Debridement     Date/Time: 3/18/2025 1:15 PM  Universal Protocol:  procedure performed by consultantConsent: Verbal consent obtained.  Risks and benefits: risks, benefits and alternatives were discussed  Consent given by: patient  Time out: Immediately prior to procedure a \"time out\" was called to verify the correct patient, procedure, equipment, support staff and site/side marked as required.  Patient understanding: patient states understanding of the procedure being performed  Patient identity confirmed: verbally with patient    Debridement Details  Performed by: physician  Debridement type: selective  Pain control: lidocaine 4%    Post-debridement measurements  Length (cm): 1.2  Width (cm): 1.1  Depth (cm): 0.1  Percent debrided: 100%  Surface Area (cm^2): 1.32  Area Debrided (cm^2): 1.32  Volume (cm^3): 0.13    Devitalized tissue debrided: fibrin  Instrument(s) utilized: curette  Technique utilized: nonexcisional  Bleeding: small  Hemostasis obtained with: pressure  Procedural pain (0-10): 0  Post-procedural pain: 0   Response to treatment: procedure was tolerated well                 Wound Instructions:  Orders Placed This Encounter   Procedures    Wound cleansing and dressings     Wound cleansing and dressings                               Wash your hands with soap and water.  Remove old dressing, discard into plastic bag and place in trash.  Cleanse the wound with mild soap and water  prior to applying a clean dressing. Do not " "use tissue or cotton balls. Do not scrub the wound. Pat dry using gauze.  Shower no do not get dressings wet may cover with cast cover purchase at pharmacy or [facility]      Apply puracol to bilateral leg wounds.  Cover with gauze  Secure with roel and tape  Change dressing every other day      Elastic Tubular Stocking: Spandagrip F    Tubular elastic bandage: Apply from base of toes to behind the knee. Apply in AM, may remove for sleep.  Avoid prolonged standing in one place.  Elevate leg(s) above the level of the heart when sitting or as much as possible.        Please try to consume 3-4 servings of protein (30g) each day.   - Each serving of protein should contain about 30 mg protein.   - Good sources of protein include, lean meats (fish, chicken, etc), eggs, dairy products (yogurt, cheese), tofu, legumes (chickpeas, lentils, peas, black beans), nuts (cashew, walnut, peanuts, etc), & quinoa.       If you develop fever, chills, nausea or vomiting, increase in drainage or foul smelling drainage go to the closest emergency department for evaluation.       In case of inclement weather, please call [phone] to verify appointment or notify us at your earliest convienment to reschedule or cancel. Thank you.       Follow up in 2 weeks     Standing Status:   Future     Expiration Date:   3/25/2025    Wound Procedure Treatment     This order was created via procedure documentation         Jessica Azevedo DPM      Portions of the record may have been created with voice recognition software. Occasional wrong word or \"sound a like\" substitutions may have occurred due to the inherent limitations of voice recognition software. Read the chart carefully and recognize, using context, where substitutions have occurred.      "

## 2025-03-18 NOTE — PATIENT INSTRUCTIONS
Orders Placed This Encounter   Procedures    Wound cleansing and dressings     Wound cleansing and dressings                               Wash your hands with soap and water.  Remove old dressing, discard into plastic bag and place in trash.  Cleanse the wound with mild soap and water  prior to applying a clean dressing. Do not use tissue or cotton balls. Do not scrub the wound. Pat dry using gauze.  Shower no do not get dressings wet may cover with cast cover purchase at pharmacy or [facility]      Apply puracol to bilateral leg wounds.  Cover with gauze  Secure with roel and tape  Change dressing every other day      Elastic Tubular Stocking: Spandagrip F    Tubular elastic bandage: Apply from base of toes to behind the knee. Apply in AM, may remove for sleep.  Avoid prolonged standing in one place.  Elevate leg(s) above the level of the heart when sitting or as much as possible.        Please try to consume 3-4 servings of protein (30g) each day.   - Each serving of protein should contain about 30 mg protein.   - Good sources of protein include, lean meats (fish, chicken, etc), eggs, dairy products (yogurt, cheese), tofu, legumes (chickpeas, lentils, peas, black beans), nuts (cashew, walnut, peanuts, etc), & quinoa.       If you develop fever, chills, nausea or vomiting, increase in drainage or foul smelling drainage go to the closest emergency department for evaluation.       In case of inclement weather, please call [phone] to verify appointment or notify us at your earliest convienment to reschedule or cancel. Thank you.       Follow up in 2 weeks     Standing Status:   Future     Expiration Date:   3/25/2025

## 2025-04-01 ENCOUNTER — OFFICE VISIT (OUTPATIENT)
Dept: WOUND CARE | Facility: CLINIC | Age: 80
End: 2025-04-01
Payer: MEDICARE

## 2025-04-01 VITALS
RESPIRATION RATE: 18 BRPM | DIASTOLIC BLOOD PRESSURE: 86 MMHG | SYSTOLIC BLOOD PRESSURE: 142 MMHG | TEMPERATURE: 97.6 F | HEART RATE: 74 BPM

## 2025-04-01 DIAGNOSIS — S81.802D TRAUMATIC OPEN WOUND OF LEFT LOWER LEG WITH DELAYED HEALING: Primary | ICD-10-CM

## 2025-04-01 DIAGNOSIS — S81.801A TRAUMATIC OPEN WOUND OF RIGHT LOWER LEG, INITIAL ENCOUNTER: ICD-10-CM

## 2025-04-01 PROCEDURE — 99213 OFFICE O/P EST LOW 20 MIN: CPT | Performed by: PODIATRIST

## 2025-04-01 PROCEDURE — 97597 DBRDMT OPN WND 1ST 20 CM/<: CPT | Performed by: PODIATRIST

## 2025-04-01 RX ORDER — LIDOCAINE HYDROCHLORIDE 40 MG/ML
5 SOLUTION TOPICAL ONCE
Status: COMPLETED | OUTPATIENT
Start: 2025-04-01 | End: 2025-04-01

## 2025-04-01 RX ADMIN — LIDOCAINE HYDROCHLORIDE 5 ML: 40 SOLUTION TOPICAL at 13:20

## 2025-04-01 NOTE — PROGRESS NOTES
Wound Procedure Treatment Traumatic Left;Lower;Lateral Leg    Performed by: Stacey Smith RN  Authorized by: Jessica Azevedo DPM  Associated wounds:   Wound 01/09/25 Traumatic Leg Left;Lower;Lateral    Wound cleansed with:  NSS   Applied primary dressing:  Dermagran   Applied secondary dressing:  Gauze   Dressing secured with:  Tu, Tape, Size F and Elastic tubular stocking

## 2025-04-01 NOTE — PROGRESS NOTES
Wound Procedure Treatment Right;Lateral;Lower Leg    Performed by: Stacey Smith RN  Authorized by: Jessica Azevedo DPM  Associated wounds:   Wound 02/18/25 Leg Right;Lateral;Lower    Wound cleansed with:  NSS   Applied primary dressing:  Collagen dressing   Applied secondary dressing:  Gauze   Dressing secured with:  Tu, Tape, Size F and Elastic tubular stocking

## 2025-04-01 NOTE — PROGRESS NOTES
Patient ID: Kim Kwon is a 79 y.o. female Date of Birth 1945       Chief Complaint   Patient presents with    Follow Up Wound Care Visit     Left Lower leg wound       Allergies:  Codeine    Diagnosis:  1. Traumatic open wound of left lower leg with delayed healing  -     lidocaine (XYLOCAINE) 4 % topical solution 5 mL  -     Wound cleansing and dressings; Future  2. Traumatic open wound of right lower leg, initial encounter  -     lidocaine (XYLOCAINE) 4 % topical solution 5 mL  -     Wound cleansing and dressings; Future     Diagnosis ICD-10-CM Associated Orders   1. Traumatic open wound of left lower leg with delayed healing  S81.802D lidocaine (XYLOCAINE) 4 % topical solution 5 mL     Wound cleansing and dressings      2. Traumatic open wound of right lower leg, initial encounter  S81.801A lidocaine (XYLOCAINE) 4 % topical solution 5 mL     Wound cleansing and dressings           Assessment & Plan:  See wound orders. Puracol bilateral wounds     -Left leg lateral traumatic wound, significantly improved with a decrease in size at today's evaluation.  Primarily dry without SOI  -LLE wound biopsy reviewed with patient: Ulcer with granulation tissue  -Right distal anterior lateral leg wound, measuring a similar size at today's evaluation however improved base with more granular tissue noted.  -RLE selective debridement performed  -Change LLE wound care to Dermagran  -Continue RLE wound care with Puracol  -DM2 A1c 6.6% 12/2024  -Patient counseled on importance of lower extremity elevation for decrease in edema  -Continue Spendagrip compression bilaterally to help with lower extremity edema  -Patient counseled on importance of strict glycemic control and high-protein diet  -Patient counseled on clinical signs of infection present to the ED if these occur  -Follow-up 2 weeks    Subjective:   HPI  4/1/25 patient presents for continued evaluation and management of bilateral lower extremity wounds.  Patient  states she has had almost no drainage from the left lower extremity wound and only minimal drainage from the right.  She denies any redness, swelling, purulence, or pain increase.  She has been keeping areas dry and has been adherent to wound care.    3/18/2025 patient presents for continued evaluation and management of bilateral lower extremity wounds.  Patient states she has been compliant with wound care.  She denies any redness, swelling, or significant drainage.  Patient denies any pain in the area.  She denies any systemic signs of infection such as nausea, vomiting, fever, chills, shortness of breath        3/4/2025 patient presents for continued evaluation of left leg traumatic wound and right leg wound.  Patient states she has been adherent to dressing changes.  She denies any increase in redness or swelling.  She denies any changes in drainage.  Patient states she has been adherent to wound care.  She denies nausea, vomiting, fever, chills, shortness of breath     2/25/2025 patient presents for evaluation management of left anterior lateral leg wound and right distal lateral leg wound.  Patient has been adherent with dressing changes and compression.  She denies any increase in pain, swelling, or drainage.  She denies any nausea, vomiting, fever, chills, shortness of breath     2/18/2025 patient presents for evaluation and management of left anterior lateral leg wound of traumatic origin.  She has been adherent to wound care.  She denies any redness, swelling, drainage changes, or pain.  Patient reports a new wound on the right anterior lateral leg.  She states she hit her leg over a month ago and a scab had been present.  The scab fell off with underlying wound being noted.  She states the area has mild inflammation but she denies any purulence, swelling, or significant pain.  Denies nausea, vomiting, fever, chills, shortness of breath     2/4/2025 patient presents for further evaluation and management of  left anterior lateral leg wound of traumatic origin.  Patient has been compliant with dressing changes, compression, and elevation.  She denies any pain, swelling, redness in the area.  She denies any systemic signs of infection such as nausea, vomiting, fever, chills, shortness of breath     1/16/2025 patient presents today for further evaluation and management of left anterior lateral leg traumatic wound sustained in October with delayed healing.  Patient has been adherent to wound care.  She denies any significant changes in wound.  Today she completes her oral doxycycline that was prescribed by her PCP.  Denies any clinical signs of infection.  Patient states she had her vascular test performed yesterday afternoon however she has not received the results and I have not received results.  Patient denies nausea, vomiting, fever, chills, shortness of breath        1/9/2025 Patient presents for evaluation and management of left anterior leg traumatic wound delayed healing.  Injury was sustained in October and has been managed by her primary care physician.  She states the area will begin to heal and then declined.  She reports mild to moderate drainage, serosanguineous.  Patient reports intermittent pain in the area of wound with activity and while resting.  She denies any significant swelling or redness at the site of injury however has mild bilateral lower extremity swelling.  Patient initially was using nonstick dressing over the area after cleaning with warm soapy water.  Recently she was advised to leave the area open to air.  She had a slight increase in redness in the area and was prescribed doxycycline by her primary care physician on 1/7/2025 and referred to wound care for further evaluation and management.  Patient denies nausea, vomiting, fever, chills, shortness of breath    The following portions of the patient's history were reviewed and updated as appropriate:   There is no problem list on file for  this patient.    Past Medical History:   Diagnosis Date    Diabetes mellitus (HCC)     Diverticulitis     GERD (gastroesophageal reflux disease)     Hypertension      Past Surgical History:   Procedure Laterality Date    CHOLECYSTECTOMY      TONSILLECTOMY       Social History     Socioeconomic History    Marital status:      Spouse name: Not on file    Number of children: Not on file    Years of education: Not on file    Highest education level: Not on file   Occupational History    Not on file   Tobacco Use    Smoking status: Never    Smokeless tobacco: Never   Vaping Use    Vaping status: Never Used   Substance and Sexual Activity    Alcohol use: Not on file    Drug use: Not on file    Sexual activity: Not on file   Other Topics Concern    Not on file   Social History Narrative    Not on file     Social Drivers of Health     Financial Resource Strain: Low Risk  (8/23/2023)    Received from [facility]    Overall Financial Resource Strain (CARDIA)     Difficulty of Paying Living Expenses: Not hard at all   Food Insecurity: No Food Insecurity (8/23/2023)    Received from [facility]    Hunger Vital Sign     Worried About Running Out of Food in the Last Year: Never true     Ran Out of Food in the Last Year: Never true   Transportation Needs: No Transportation Needs (8/23/2023)    Received from [facility]    PRAPARE - Transportation     Lack of Transportation (Medical): No     Lack of Transportation (Non-Medical): No   Physical Activity: Not on file   Stress: No Stress Concern Present (8/23/2023)    Received from [facility]    Citizen of [place] of Occupational [employer] - Occupational Stress Questionnaire     Feeling of Stress : Not at all   Social Connections: Unknown (6/18/2024)    Received from [facility]    Social Connections     How often do you feel lonely or isolated from those around you? (Adult - for ages 18 years and over): Not on file    Intimate Partner Violence: Not At Risk (8/23/2023)    Received from [facility]    Humiliation, Afraid, Rape, and Kick questionnaire     Fear of Current or Ex-Partner: No     Emotionally Abused: No     Physically Abused: No     Sexually Abused: No   Housing Stability: Not on file        Current Outpatient Medications:     Ascorbic Acid (vitamin C) 100 MG tablet, Take 100 mg by mouth daily, Disp: , Rfl:     aspirin (ECOTRIN LOW STRENGTH) 81 mg EC tablet, Take 81 mg by mouth daily, Disp: , Rfl:     Blood Glucose Monitoring Suppl (FreeStyle Lite) w/Device KIT, USE AS DIRECTED E11.9, Disp: , Rfl:     Cholecalciferol 25 MCG (1000 UT) capsule, Take 5,000 Units by mouth daily, Disp: , Rfl:     esomeprazole (NexIUM) 40 MG capsule, Take 40 mg by mouth if needed, Disp: , Rfl:     FREESTYLE LITE test strip, USE 1 STRIP TO CHECK GLUCOSE ONCE DAILY, Disp: , Rfl:     ibuprofen (MOTRIN) 200 mg tablet, Take 200 mg by mouth every 6 (six) hours as needed, Disp: , Rfl:     meloxicam (MOBIC) 15 mg tablet, Take 15 mg by mouth if needed, Disp: , Rfl:     metFORMIN (GLUCOPHAGE-XR) 500 mg 24 hr tablet, Take 500 mg by mouth daily with breakfast, Disp: , Rfl:     metoprolol succinate (TOPROL-XL) 50 mg 24 hr tablet, Take 50 mg by mouth daily, Disp: , Rfl:     Multiple Vitamins-Minerals (CENTRUM SILVER PO), Take by mouth, Disp: , Rfl:     mupirocin (BACTROBAN) 2 % ointment, Apply topically daily, Disp: 30 g, Rfl: 1    olmesartan-hydrochlorothiazide (BENICAR HCT) 20-12.5 MG per tablet, Take 1 tablet by mouth daily, Disp: , Rfl:   No current facility-administered medications for this visit.  No family history on file.   Review of Systems  Constitutional:  Negative for chills and fever.   Respiratory:  Negative for chest tightness and shortness of breath.    Cardiovascular:  Positive for leg swelling.   Musculoskeletal:  Positive for arthralgias, back pain and gait problem.   Skin:  Positive for wound.     Objective:  /86    Pulse 74   Temp 97.6 °F (36.4 °C)   Resp 18     Physical Exam  Constitutional:       General: She is not in acute distress.     Appearance: She is not ill-appearing.   Cardiovascular:      Comments: Left DP pulse 1/4, PT pulse 0/4  CRT less than 3 seconds  Absent hair growth to distal digits  Mild nonpitting edema to bilateral lower extremities  Pulmonary:      Effort: No respiratory distress.   Musculoskeletal:      Comments: MMT 5/5 at level of ankle bilaterally  No pain with palpitation of posterior calves bilaterally   Skin:     Comments: Left anterior lateral leg traumatic wound nearly epithelialized with overlying dry skin and small area of granular base.  No significant erythema.  No significant localized edema.  Mild tenderness with direct palpation.  No crepitus, no fluctuance, no malodor.  No drainage     Right lower extremity distal anterior lateral traumatic wound.  Fibrogranular base with granular tissue.  Surrounding inflammation without acute erythema.  No significant edema.  Nontender with palpation.  Trace serous drainage expressed.  No purulence expressed, no crepitus, no fluctuance, no malodor      Wound 01/09/25 Traumatic Leg Left;Lower;Lateral (Active)   Wound Image   04/01/25 1313   Wound Description Brown;Dry 04/01/25 1319   Non-staged Wound Description Full thickness 04/01/25 1319   Wound Length (cm) 0.1 cm 04/01/25 1319   Wound Width (cm) 0.1 cm 04/01/25 1319   Wound Depth (cm) 0.1 cm 04/01/25 1319   Wound Surface Area (cm^2) 0.01 cm^2 04/01/25 1319   Wound Volume (cm^3) 0.001 cm^3 04/01/25 1319   Calculated Wound Volume (cm^3) 0 cm^3 04/01/25 1319   Change in Wound Size % 100 04/01/25 1319   Drainage Amount Scant 04/01/25 1319   Drainage Description Serosanguineous 04/01/25 1319   Sabrina-wound Assessment Pink;Edema 04/01/25 1319       Wound 02/18/25 Leg Right;Lateral;Lower (Active)   Wound Image   04/01/25 1313   Wound Description Yellow;Pink;Slough;Granulation tissue 04/01/25 1319  "  Non-staged Wound Description Full thickness 04/01/25 1319   Wound Length (cm) 1.3 cm 04/01/25 1319   Wound Width (cm) 1 cm 04/01/25 1319   Wound Depth (cm) 0.1 cm 04/01/25 1319   Wound Surface Area (cm^2) 1.3 cm^2 04/01/25 1319   Wound Volume (cm^3) 0.13 cm^3 04/01/25 1319   Calculated Wound Volume (cm^3) 0.13 cm^3 04/01/25 1319   Change in Wound Size % -160 04/01/25 1319   Drainage Amount Small 04/01/25 1319   Drainage Description Serosanguineous 03/18/25 1308   Sabrina-wound Assessment Edema 04/01/25 1319                         Debridement   Wound 02/18/25 Leg Right;Lateral;Lower     Date/Time: 4/1/2025 1:15 PM  Universal Protocol:  procedure performed by consultantConsent: Verbal consent obtained.  Risks and benefits: risks, benefits and alternatives were discussed  Consent given by: patient  Time out: Immediately prior to procedure a \"time out\" was called to verify the correct patient, procedure, equipment, support staff and site/side marked as required.  Patient understanding: patient states understanding of the procedure being performed  Patient identity confirmed: verbally with patient    Debridement Details  Performed by: physician  Debridement type: selective  Pain control: lidocaine 5%    Post-debridement measurements  Length (cm): 1.3  Width (cm): 1  Depth (cm): 0.1  Percent debrided: 100%  Surface Area (cm^2): 1.3  Area Debrided (cm^2): 1.3  Volume (cm^3): 0.13    Devitalized tissue debrided: fibrin  Instrument(s) utilized: curette  Technique utilized: nonexcisional  Bleeding: small  Hemostasis obtained with: pressure  Procedural pain (0-10): 2  Post-procedural pain: 0   Response to treatment: procedure was tolerated well                 Wound Instructions:  Orders Placed This Encounter   Procedures    Wound cleansing and dressings     Wound cleansing and dressings                               Wash your hands with soap and water.  Remove old dressing, discard into plastic bag and place in trash.  " "Cleanse the wound with mild soap and water  prior to applying a clean dressing. Do not use tissue or cotton balls. Do not scrub the wound. Pat dry using gauze.  Shower no do not get dressings wet may cover with cast cover purchase at [employer] or [employer]      Apply puracol to right leg wound. Apply dermagran to left leg wound Cover with gauze  Secure with roel and tape  Change dressing every other day      Elastic Tubular Stocking: Spandagrip F     Tubular elastic bandage: Apply from base of toes to behind the knee. Apply in AM, may remove for sleep.  Avoid prolonged standing in one place.  Elevate leg(s) above the level of the heart when sitting or as much as possible.        Please try to consume 3-4 servings of protein (30g) each day.   - Each serving of protein should contain about 30 mg protein.   - Good sources of protein include, lean meats (fish, chicken, etc), eggs, dairy products (yogurt, cheese), tofu, legumes (chickpeas, lentils, peas, black beans), nuts (cashew, walnut, peanuts, etc), & quinoa.       If you develop fever, chills, nausea or vomiting, increase in drainage or foul smelling drainage go to the closest emergency department for evaluation.       In case of inclement weather, please call [phone] to verify appointment or notify us at your earliest convienment to reschedule or cancel. Thank you.    Follow up in 2 weeks     Standing Status:   Future     Expiration Date:   4/8/2025         Jessica Azevedo DPM      Portions of the record may have been created with voice recognition software. Occasional wrong word or \"sound a like\" substitutions may have occurred due to the inherent limitations of voice recognition software. Read the chart carefully and recognize, using context, where substitutions have occurred.      "

## 2025-04-01 NOTE — PATIENT INSTRUCTIONS
Orders Placed This Encounter   Procedures    Wound cleansing and dressings     Wound cleansing and dressings                               Wash your hands with soap and water.  Remove old dressing, discard into plastic bag and place in trash.  Cleanse the wound with mild soap and water  prior to applying a clean dressing. Do not use tissue or cotton balls. Do not scrub the wound. Pat dry using gauze.  Shower no do not get dressings wet may cover with cast cover purchase at [employer] or [employer]      Apply puracol to right leg wound. Apply dermagran to left leg wound Cover with gauze  Secure with roel and tape  Change dressing every other day      Elastic Tubular Stocking: Spandagrip F     Tubular elastic bandage: Apply from base of toes to behind the knee. Apply in AM, may remove for sleep.  Avoid prolonged standing in one place.  Elevate leg(s) above the level of the heart when sitting or as much as possible.        Please try to consume 3-4 servings of protein (30g) each day.   - Each serving of protein should contain about 30 mg protein.   - Good sources of protein include, lean meats (fish, chicken, etc), eggs, dairy products (yogurt, cheese), tofu, legumes (chickpeas, lentils, peas, black beans), nuts (cashew, walnut, peanuts, etc), & quinoa.       If you develop fever, chills, nausea or vomiting, increase in drainage or foul smelling drainage go to the closest emergency department for evaluation.       In case of inclement weather, please call [phone] to verify appointment or notify us at your earliest convienment to reschedule or cancel. Thank you.    Follow up in 2 weeks     Standing Status:   Future     Expiration Date:   4/8/2025

## 2025-04-15 ENCOUNTER — OFFICE VISIT (OUTPATIENT)
Dept: WOUND CARE | Facility: CLINIC | Age: 80
End: 2025-04-15

## 2025-04-15 VITALS
HEART RATE: 80 BPM | DIASTOLIC BLOOD PRESSURE: 84 MMHG | SYSTOLIC BLOOD PRESSURE: 124 MMHG | TEMPERATURE: 96.7 F | RESPIRATION RATE: 18 BRPM

## 2025-04-15 DIAGNOSIS — S81.801A TRAUMATIC OPEN WOUND OF RIGHT LOWER LEG, INITIAL ENCOUNTER: ICD-10-CM

## 2025-04-15 DIAGNOSIS — S81.802D TRAUMATIC OPEN WOUND OF LEFT LOWER LEG WITH DELAYED HEALING: Primary | ICD-10-CM

## 2025-04-15 RX ORDER — LIDOCAINE 40 MG/G
CREAM TOPICAL ONCE
Status: COMPLETED | OUTPATIENT
Start: 2025-04-15 | End: 2025-04-15

## 2025-04-15 RX ADMIN — LIDOCAINE: 40 CREAM TOPICAL at 13:25

## 2025-04-15 NOTE — PROGRESS NOTES
Wound Procedure Treatment Traumatic Left;Lower;Lateral Leg    Performed by: Shae Espinal RN  Authorized by: Jessica Azevedo DPM  Associated wounds:   Wound 01/09/25 Traumatic Leg Left;Lower;Lateral    Wound cleansed with:  NSS   Applied primary dressing:  Dermagran and Silicone bordered foam

## 2025-04-15 NOTE — PROGRESS NOTES
Patient ID: Kim Kwon is a 79 y.o. female Date of Birth 1945       Chief Complaint   Patient presents with    Follow Up Wound Care Visit     BLLE wounds        Allergies:  Codeine    Diagnosis:  1. Traumatic open wound of left lower leg with delayed healing  -     Wound cleansing and dressings; Future; Expected date: 04/22/2025  -     lidocaine (LMX) 4 % cream  -     Wound Procedure Treatment Traumatic Left;Lower;Lateral Leg  2. Traumatic open wound of right lower leg, initial encounter  -     Wound cleansing and dressings; Future; Expected date: 04/22/2025  -     lidocaine (LMX) 4 % cream  -     Chemical Caut Of A Wound  -     Wound Procedure Treatment Right;Lateral;Lower Leg     Diagnosis ICD-10-CM Associated Orders   1. Traumatic open wound of left lower leg with delayed healing  S81.802D Wound cleansing and dressings     lidocaine (LMX) 4 % cream     Wound Procedure Treatment Traumatic Left;Lower;Lateral Leg      2. Traumatic open wound of right lower leg, initial encounter  S81.801A Wound cleansing and dressings     lidocaine (LMX) 4 % cream     Chemical Caut Of A Wound     Wound Procedure Treatment Right;Lateral;Lower Leg           Assessment & Plan:  See wound orders.  Left to Dermagran  Right Puracol     -Left leg lateral traumatic wound, almost healed.  No SOI  -LLE wound biopsy reviewed with patient: Ulcer with granulation tissue  -Right distal anterior lateral leg wound, measuring smaller.  Hypergranulation tissue noted.  Selective debridement and chemical cautery performed  -RLE selective debridement performed  -Continue RLE wound care with Puracol  -DM2 A1c 6.6% 12/2024  -Patient counseled on importance of lower extremity elevation for decrease in edema  -Continue Spendagrip compression bilaterally to help with lower extremity edema  -Patient counseled on importance of strict glycemic control and high-protein diet  -Patient counseled on clinical signs of infection present to the ED if these  occur  -Follow-up 2 weeks      Subjective:   HPI  4/15/25 left leg traumatic wound almost healed.  Right lower extremity wound still present.  Patient denies any systemic signs of infection.  Patient states she does have itching sensation around right leg wound at times.  She denies any increase in drainage.  She denies any increase in redness or swelling.    4/1/25 patient presents for continued evaluation and management of bilateral lower extremity wounds.  Patient states she has had almost no drainage from the left lower extremity wound and only minimal drainage from the right.  She denies any redness, swelling, purulence, or pain increase.  She has been keeping areas dry and has been adherent to wound care.     3/18/2025 patient presents for continued evaluation and management of bilateral lower extremity wounds.  Patient states she has been compliant with wound care.  She denies any redness, swelling, or significant drainage.  Patient denies any pain in the area.  She denies any systemic signs of infection such as nausea, vomiting, fever, chills, shortness of breath        3/4/2025 patient presents for continued evaluation of left leg traumatic wound and right leg wound.  Patient states she has been adherent to dressing changes.  She denies any increase in redness or swelling.  She denies any changes in drainage.  Patient states she has been adherent to wound care.  She denies nausea, vomiting, fever, chills, shortness of breath     2/25/2025 patient presents for evaluation management of left anterior lateral leg wound and right distal lateral leg wound.  Patient has been adherent with dressing changes and compression.  She denies any increase in pain, swelling, or drainage.  She denies any nausea, vomiting, fever, chills, shortness of breath     2/18/2025 patient presents for evaluation and management of left anterior lateral leg wound of traumatic origin.  She has been adherent to wound care.  She denies any  redness, swelling, drainage changes, or pain.  Patient reports a new wound on the right anterior lateral leg.  She states she hit her leg over a month ago and a scab had been present.  The scab fell off with underlying wound being noted.  She states the area has mild inflammation but she denies any purulence, swelling, or significant pain.  Denies nausea, vomiting, fever, chills, shortness of breath     2/4/2025 patient presents for further evaluation and management of left anterior lateral leg wound of traumatic origin.  Patient has been compliant with dressing changes, compression, and elevation.  She denies any pain, swelling, redness in the area.  She denies any systemic signs of infection such as nausea, vomiting, fever, chills, shortness of breath     1/16/2025 patient presents today for further evaluation and management of left anterior lateral leg traumatic wound sustained in October with delayed healing.  Patient has been adherent to wound care.  She denies any significant changes in wound.  Today she completes her oral doxycycline that was prescribed by her PCP.  Denies any clinical signs of infection.  Patient states she had her vascular test performed yesterday afternoon however she has not received the results and I have not received results.  Patient denies nausea, vomiting, fever, chills, shortness of breath        1/9/2025 Patient presents for evaluation and management of left anterior leg traumatic wound delayed healing.  Injury was sustained in October and has been managed by her primary care physician.  She states the area will begin to heal and then declined.  She reports mild to moderate drainage, serosanguineous.  Patient reports intermittent pain in the area of wound with activity and while resting.  She denies any significant swelling or redness at the site of injury however has mild bilateral lower extremity swelling.  Patient initially was using nonstick dressing over the area after  cleaning with warm soapy water.  Recently she was advised to leave the area open to air.  She had a slight increase in redness in the area and was prescribed doxycycline by her primary care physician on 1/7/2025 and referred to wound care for further evaluation and management.  Patient denies nausea, vomiting, fever, chills, shortness of breath  The following portions of the patient's history were reviewed and updated as appropriate:   There is no problem list on file for this patient.    Past Medical History:   Diagnosis Date    Diabetes mellitus (HCC)     Diverticulitis     GERD (gastroesophageal reflux disease)     Hypertension      Past Surgical History:   Procedure Laterality Date    CHOLECYSTECTOMY      TONSILLECTOMY       Social History     Socioeconomic History    Marital status:      Spouse name: Not on file    Number of children: Not on file    Years of education: Not on file    Highest education level: Not on file   Occupational History    Not on file   Tobacco Use    Smoking status: Never    Smokeless tobacco: Never   Vaping Use    Vaping status: Never Used   Substance and Sexual Activity    Alcohol use: Not on file    Drug use: Not on file    Sexual activity: Not on file   Other Topics Concern    Not on file   Social History Narrative    Not on file     Social Drivers of Health     Financial Resource Strain: Not At Risk (4/15/2025)    Received from [facility]    Financial Insecurity     In the last 12 months did you skip medications to save money?: No     In the last 12 months was there a time when you needed to see a doctor but could not because of cost?: No   Food Insecurity: No Food Insecurity (4/15/2025)    Received from [facility]    Food Insecurity     In the last 12 months did you ever eat less than you felt you should because there wasn't enough money for food?: No   Transportation Needs: No Transportation Needs (4/15/2025)    Received from [facility]    Transportation Needs     In the last 12 months have you ever had to go without healthcare because you didn't have a way to get there?: No   Physical Activity: Not on file   Stress: No Stress Concern Present (8/23/2023)    Received from [facility]    [employer] - Occupational Stress Questionnaire     Feeling of Stress : Not at all   Social Connections: Socially Integrated (4/15/2025)    Received from [facility]    Social Connection     Do you often feel lonely?: No   Intimate Partner Violence: Not At Risk (8/23/2023)    Received from [facility]    Humiliation, Afraid, Rape, and Kick questionnaire     Fear of Current or Ex-Partner: No     Emotionally Abused: No     Physically Abused: No     Sexually Abused: No   Housing Stability: Not At Risk (4/15/2025)    Received from [facility]    Housing Stability     Are you worried that in the next 2 months you may not have stable housing?: No        Current Outpatient Medications:     Ascorbic Acid (vitamin C) 100 MG tablet, Take 100 mg by mouth daily, Disp: , Rfl:     aspirin (ECOTRIN LOW STRENGTH) 81 mg EC tablet, Take 81 mg by mouth daily, Disp: , Rfl:     Blood Glucose Monitoring Suppl (FreeStyle Lite) w/Device KIT, USE AS DIRECTED E11.9, Disp: , Rfl:     Cholecalciferol 25 MCG (1000 UT) capsule, Take 5,000 Units by mouth daily, Disp: , Rfl:     esomeprazole (NexIUM) 40 MG capsule, Take 40 mg by mouth if needed, Disp: , Rfl:     FREESTYLE LITE test strip, USE 1 STRIP TO CHECK GLUCOSE ONCE DAILY, Disp: , Rfl:     ibuprofen (MOTRIN) 200 mg tablet, Take 200 mg by mouth every 6 (six) hours as needed, Disp: , Rfl:     meloxicam (MOBIC) 15 mg tablet, Take 15 mg by mouth if needed, Disp: , Rfl:     metFORMIN (GLUCOPHAGE-XR) 500 mg 24 hr tablet, Take 500 mg by mouth daily with breakfast, Disp: , Rfl:     metoprolol succinate (TOPROL-XL) 50 mg 24 hr tablet,  Take 50 mg by mouth daily, Disp: , Rfl:     Multiple Vitamins-Minerals (CENTRUM SILVER PO), Take by mouth, Disp: , Rfl:     mupirocin (BACTROBAN) 2 % ointment, Apply topically daily, Disp: 30 g, Rfl: 1    olmesartan-hydrochlorothiazide (BENICAR HCT) 20-12.5 MG per tablet, Take 1 tablet by mouth daily, Disp: , Rfl:   No current facility-administered medications for this visit.  No family history on file.   Review of Systems  Constitutional:  Negative for chills and fever.   Respiratory:  Negative for chest tightness and shortness of breath.    Cardiovascular:  Positive for leg swelling.   Musculoskeletal:  Positive for arthralgias, back pain and gait problem.   Skin:  Positive for wound.        Objective:  /84   Pulse 80   Temp (!) 96.7 °F (35.9 °C)   Resp 18     Physical Exam    Constitutional:       General: She is not in acute distress.     Appearance: She is not ill-appearing.   Cardiovascular:      Comments: Left DP pulse 1/4, PT pulse 0/4  CRT less than 3 seconds  Absent hair growth to distal digits  Mild nonpitting edema to bilateral lower extremities  Pulmonary:      Effort: No respiratory distress.   Musculoskeletal:      Comments: MMT 5/5 at level of ankle bilaterally  No pain with palpitation of posterior calves bilaterally   Skin:     Comments:   Left anterior lateral leg traumatic wound nearly epithelialized with overlying dry skin and small area of granular base.  No significant erythema.  No significant localized edema.  Mild tenderness with direct palpation.  No crepitus, no fluctuance, no malodor.  No drainage     Right lower extremity distal anterior lateral traumatic wound.  Fibrogranular base with hypergranulation tissue.  Surrounding inflammation without acute erythema.  No significant edema.  Nontender with palpation.  Trace serous drainage expressed.  No purulence expressed, no crepitus, no fluctuance, no malodor    Wound 01/09/25 Traumatic Leg Left;Lower;Lateral (Active)   Wound Image  "  04/15/25 1322   Wound Description Pink 04/15/25 1323   Non-staged Wound Description Full thickness 04/15/25 1323   Wound Length (cm) 0.1 cm 04/15/25 1323   Wound Width (cm) 0.1 cm 04/15/25 1323   Wound Depth (cm) 0.1 cm 04/15/25 1323   Wound Surface Area (cm^2) 0.01 cm^2 04/15/25 1323   Wound Volume (cm^3) 0.001 cm^3 04/15/25 1323   Calculated Wound Volume (cm^3) 0 cm^3 04/15/25 1323   Change in Wound Size % 100 04/15/25 1323   Drainage Amount Scant 04/15/25 1323   Drainage Description Serosanguineous 04/15/25 1323   Sabrina-wound Assessment Pink;Scaly 04/15/25 1323       Wound 02/18/25 Leg Right;Lateral;Lower (Active)   Wound Image   04/15/25 1321   Wound Description Yellow;Pink;Slough;Granulation tissue;Hypergranulation 04/15/25 1323   Non-staged Wound Description Full thickness 04/15/25 1323   Wound Length (cm) 1 cm 04/15/25 1323   Wound Width (cm) 1 cm 04/15/25 1323   Wound Depth (cm) 0.1 cm 04/15/25 1323   Wound Surface Area (cm^2) 1 cm^2 04/15/25 1323   Wound Volume (cm^3) 0.1 cm^3 04/15/25 1323   Calculated Wound Volume (cm^3) 0.1 cm^3 04/15/25 1323   Change in Wound Size % -100 04/15/25 1323   Drainage Amount Small 04/15/25 1323   Drainage Description Serosanguineous 03/18/25 1308   Sabrina-wound Assessment Pink;Dry;Scaly;Scar Tissue 04/15/25 1323                             Chemical Caut Of A Wound     Date/Time  4/15/2025 1:15 PM     Performed by  Jessica Azevedo DPM   Authorized by  Jessica Azevedo DPM     Universal Protocol   procedure performed by consultantConsent: Verbal consent obtained.  Risks and benefits: risks, benefits and alternatives were discussed  Consent given by: patient  Time out: Immediately prior to procedure a \"time out\" was called to verify the correct patient, procedure, equipment, support staff and site/side marked as required.  Patient understanding: patient states understanding of the procedure being performed  Patient identity confirmed: verbally with patient      Local " anesthesia used: no     Anesthesia   Local anesthesia used: no     Sedation   Patient sedated: no       Procedure Details   Procedure Notes: Wound selectively debrided with curette for removal of fibrotic tissue and evaluation of wound bed.  Debrided down to healthy granular base.  Increasing granulation tissue noted beyond surface of the wound.  Silver nitrate used to chemically cauterize granulation tissue approximately 50% of wound wound measuring 1 cm x 1 cm.  Area cleansed and dressing of Adaptic, Puracol, DSD applied.  Patient tolerated procedure well                      Wound Instructions:  Orders Placed This Encounter   Procedures    Wound cleansing and dressings     Wash your hands with soap and water.  Remove old dressing, discard into plastic bag and place in trash.  Cleanse the wound with mild soap and water  prior to applying a clean dressing. Do not use tissue or cotton balls. Do not scrub the wound. Pat dry using gauze.  Shower no do not get dressings wet may cover with cast cover purchase at [facility] or [employer]      Apply puracol to right leg wound. Apply dermagran to left leg wound Cover with silicon bordered foam  Secure with roel and tape  Change dressing every other day      Elastic Tubular Stocking: Spandagrip F     Tubular elastic bandage: Apply from base of toes to behind the knee. Apply in AM, may remove for sleep.  Avoid prolonged standing in one place.  Elevate leg(s) above the level of the heart when sitting or as much as possible.        Please try to consume 3-4 servings of protein (30g) each day.   - Each serving of protein should contain about 30 mg protein.   - Good sources of protein include, lean meats (fish, chicken, etc), eggs, dairy products (yogurt, cheese), tofu, legumes (chickpeas, lentils, peas, black beans), nuts (cashew, walnut, peanuts, etc), & quinoa.       If you develop fever, chills, nausea or vomiting, increase in drainage or foul smelling drainage go to the closest  "emergency department for evaluation.       In case of inclement weather, please call [phone] to verify appointment or notify us at your earliest convienment to reschedule or cancel. Thank you.     Follow up in 2 weeks     Standing Status:   Future     Expected Date:   4/22/2025     Expiration Date:   4/22/2025    Chemical Caut Of A Wound     This order was created via procedure documentation    Wound Procedure Treatment Traumatic Left;Lower;Lateral Leg     This order was created via procedure documentation    Wound Procedure Treatment Right;Lateral;Lower Leg     This order was created via procedure documentation         Jessica Azevedo DPM      Portions of the record may have been created with voice recognition software. Occasional wrong word or \"sound a like\" substitutions may have occurred due to the inherent limitations of voice recognition software. Read the chart carefully and recognize, using context, where substitutions have occurred.      "

## 2025-04-15 NOTE — PROGRESS NOTES
Wound Procedure Treatment Right;Lateral;Lower Leg    Performed by: Shae Espinal RN  Authorized by: Jessica Azevedo DPM  Associated wounds:   Wound 02/18/25 Leg Right;Lateral;Lower    Wound cleansed with:  NSS   Applied primary dressing:  Collagen dressing   Applied secondary dressing:  Gauze   Dressing secured with:  Tu, Tape, Elastic tubular stocking and Size F

## 2025-04-15 NOTE — PATIENT INSTRUCTIONS
Orders Placed This Encounter   Procedures    Wound cleansing and dressings     Wash your hands with soap and water.  Remove old dressing, discard into plastic bag and place in trash.  Cleanse the wound with mild soap and water  prior to applying a clean dressing. Do not use tissue or cotton balls. Do not scrub the wound. Pat dry using gauze.  Shower no do not get dressings wet may cover with cast cover purchase at [employer] or [employer]      Apply puracol to right leg wound. Apply dermagran to left leg wound Cover with silicon bordered foam  Secure with roel and tape  Change dressing every other day      Elastic Tubular Stocking: Spandagrip F     Tubular elastic bandage: Apply from base of toes to behind the knee. Apply in AM, may remove for sleep.  Avoid prolonged standing in one place.  Elevate leg(s) above the level of the heart when sitting or as much as possible.        Please try to consume 3-4 servings of protein (30g) each day.   - Each serving of protein should contain about 30 mg protein.   - Good sources of protein include, lean meats (fish, chicken, etc), eggs, dairy products (yogurt, cheese), tofu, legumes (chickpeas, lentils, peas, black beans), nuts (cashew, walnut, peanuts, etc), & quinoa.       If you develop fever, chills, nausea or vomiting, increase in drainage or foul smelling drainage go to the closest emergency department for evaluation.       In case of inclement weather, please call [phone] to verify appointment or notify us at your earliest convienment to reschedule or cancel. Thank you.     Follow up in 2 weeks     Standing Status:   Future     Expected Date:   4/22/2025     Expiration Date:   4/22/2025    Chemical Caut Of A Wound     This order was created via procedure documentation    Wound Procedure Treatment Traumatic Left;Lower;Lateral Leg     This order was created via procedure documentation    Wound Procedure Treatment Right;Lateral;Lower Leg     This order was created via  procedure documentation

## 2025-04-29 ENCOUNTER — OFFICE VISIT (OUTPATIENT)
Dept: WOUND CARE | Facility: CLINIC | Age: 80
End: 2025-04-29
Payer: MEDICARE

## 2025-04-29 VITALS
TEMPERATURE: 96.2 F | SYSTOLIC BLOOD PRESSURE: 122 MMHG | DIASTOLIC BLOOD PRESSURE: 80 MMHG | RESPIRATION RATE: 18 BRPM | HEART RATE: 80 BPM

## 2025-04-29 DIAGNOSIS — S81.802D TRAUMATIC OPEN WOUND OF LEFT LOWER LEG WITH DELAYED HEALING: ICD-10-CM

## 2025-04-29 DIAGNOSIS — S81.801A TRAUMATIC OPEN WOUND OF RIGHT LOWER LEG, INITIAL ENCOUNTER: Primary | ICD-10-CM

## 2025-04-29 PROCEDURE — 97597 DBRDMT OPN WND 1ST 20 CM/<: CPT | Performed by: PODIATRIST

## 2025-04-29 PROCEDURE — 99214 OFFICE O/P EST MOD 30 MIN: CPT | Performed by: PODIATRIST

## 2025-04-29 RX ORDER — LIDOCAINE 40 MG/G
CREAM TOPICAL ONCE
Status: COMPLETED | OUTPATIENT
Start: 2025-04-29 | End: 2025-04-29

## 2025-04-29 RX ADMIN — LIDOCAINE: 40 CREAM TOPICAL at 13:07

## 2025-04-29 NOTE — PATIENT INSTRUCTIONS
Orders Placed This Encounter   Procedures    Wound cleansing and dressings     Wash your hands with soap and water.  Remove old dressing, discard into plastic bag and place in trash.  Cleanse the wound with mild soap and water  prior to applying a clean dressing. Do not use tissue or cotton balls. Do not scrub the wound. Pat dry using gauze.  Shower no do not get dressings wet may cover with cast cover purchase at pharmacy or [employer]      Apply steroid cream to left leg fran wound only    Apply puracol to right leg wound. Apply dermagran to left leg wound Cover with silicon bordered foam  Secure with roel and tape  Change dressing every other day      Elastic Tubular Stocking: Spandagrip F     Tubular elastic bandage: Apply from base of toes to behind the knee. Apply in AM, may remove for sleep.  Avoid prolonged standing in one place.  Elevate leg(s) above the level of the heart when sitting or as much as possible.        Please try to consume 3-4 servings of protein (30g) each day.   - Each serving of protein should contain about 30 mg protein.   - Good sources of protein include, lean meats (fish, chicken, etc), eggs, dairy products (yogurt, cheese), tofu, legumes (chickpeas, lentils, peas, black beans), nuts (cashew, walnut, peanuts, etc), & quinoa.       If you develop fever, chills, nausea or vomiting, increase in drainage or foul smelling drainage go to the closest emergency department for evaluation.          Follow up in 2 weeks     Standing Status:   Future     Expiration Date:   5/6/2025

## 2025-04-29 NOTE — PROGRESS NOTES
Wound Procedure Treatment Traumatic Left;Lower;Lateral Leg    Performed by: Stacey Smith RN  Authorized by: Jessica Azevedo DPM  Associated wounds:   Wound 01/09/25 Traumatic Leg Left;Lower;Lateral    Wound cleansed with:  NSS   Applied to periwound:  Steroid cream / ointment   Applied primary dressing:  Dermagran   Applied secondary dressing:  Gauze   Dressing secured with:  Tape, Elastic tubular stocking and Size F

## 2025-04-29 NOTE — PROGRESS NOTES
Patient ID: Kim Kwon is a 79 y.o. female Date of Birth 1945       Chief Complaint   Patient presents with    Follow Up Wound Care Visit     Right Lower leg wound       Allergies:  Codeine    Diagnosis:  1. Traumatic open wound of right lower leg, initial encounter  -     lidocaine (LMX) 4 % cream  -     Wound cleansing and dressings; Future  -     Wound Procedure Treatment Right;Lateral;Lower Leg  2. Traumatic open wound of left lower leg with delayed healing  -     Wound cleansing and dressings; Future  -     Wound Procedure Treatment Traumatic Left;Lower;Lateral Leg     Diagnosis ICD-10-CM Associated Orders   1. Traumatic open wound of right lower leg, initial encounter  S81.801A lidocaine (LMX) 4 % cream     Wound cleansing and dressings     Wound Procedure Treatment Right;Lateral;Lower Leg      2. Traumatic open wound of left lower leg with delayed healing  S81.802D Wound cleansing and dressings     Wound Procedure Treatment Traumatic Left;Lower;Lateral Leg           Assessment & Plan:  See wound orders.    Left to Dermagran and triamcinolone   Right Puracol     -Left leg lateral traumatic wound, almost healed, area of skin inflammation noted.  No SOI  -Rx triamcinalone   -Right distal anterior lateral leg wound, measuring smaller.  Selective debridement performed. No soi   -Continue RLE wound care with Puracol  -DM2 A1c 6.6% 12/2024  -Patient counseled on importance of lower extremity elevation for decrease in edema  -Continue Spendagrip compression bilaterally to help with lower extremity edema  -Patient counseled on importance of strict glycemic control and high-protein diet  -Exercise regularly to help with lower extremity edema   -Vascular studies reviewed   -PCP note reviewed   -Patient counseled on clinical signs of infection present to the ED if these occur  -Follow-up 2 weeks    Subjective:   HPI  4/29/25 patient presents for continued evaluation and management of bilateral lower extremity  wounds.  Left leg traumatic wound and right lower extremity wound.  Patient denies any increase in redness, swelling, or drainage.  She has been wearing the lower extremity compression as directed    4/15/25 left leg traumatic wound almost healed.  Right lower extremity wound still present.  Patient denies any systemic signs of infection.  Patient states she does have itching sensation around right leg wound at times.  She denies any increase in drainage.  She denies any increase in redness or swelling.     4/1/25 patient presents for continued evaluation and management of bilateral lower extremity wounds.  Patient states she has had almost no drainage from the left lower extremity wound and only minimal drainage from the right.  She denies any redness, swelling, purulence, or pain increase.  She has been keeping areas dry and has been adherent to wound care.     3/18/2025 patient presents for continued evaluation and management of bilateral lower extremity wounds.  Patient states she has been compliant with wound care.  She denies any redness, swelling, or significant drainage.  Patient denies any pain in the area.  She denies any systemic signs of infection such as nausea, vomiting, fever, chills, shortness of breath        3/4/2025 patient presents for continued evaluation of left leg traumatic wound and right leg wound.  Patient states she has been adherent to dressing changes.  She denies any increase in redness or swelling.  She denies any changes in drainage.  Patient states she has been adherent to wound care.  She denies nausea, vomiting, fever, chills, shortness of breath     2/25/2025 patient presents for evaluation management of left anterior lateral leg wound and right distal lateral leg wound.  Patient has been adherent with dressing changes and compression.  She denies any increase in pain, swelling, or drainage.  She denies any nausea, vomiting, fever, chills, shortness of breath     2/18/2025  patient presents for evaluation and management of left anterior lateral leg wound of traumatic origin.  She has been adherent to wound care.  She denies any redness, swelling, drainage changes, or pain.  Patient reports a new wound on the right anterior lateral leg.  She states she hit her leg over a month ago and a scab had been present.  The scab fell off with underlying wound being noted.  She states the area has mild inflammation but she denies any purulence, swelling, or significant pain.  Denies nausea, vomiting, fever, chills, shortness of breath     2/4/2025 patient presents for further evaluation and management of left anterior lateral leg wound of traumatic origin.  Patient has been compliant with dressing changes, compression, and elevation.  She denies any pain, swelling, redness in the area.  She denies any systemic signs of infection such as nausea, vomiting, fever, chills, shortness of breath     1/16/2025 patient presents today for further evaluation and management of left anterior lateral leg traumatic wound sustained in October with delayed healing.  Patient has been adherent to wound care.  She denies any significant changes in wound.  Today she completes her oral doxycycline that was prescribed by her PCP.  Denies any clinical signs of infection.  Patient states she had her vascular test performed yesterday afternoon however she has not received the results and I have not received results.  Patient denies nausea, vomiting, fever, chills, shortness of breath        1/9/2025 Patient presents for evaluation and management of left anterior leg traumatic wound delayed healing.  Injury was sustained in October and has been managed by her primary care physician.  She states the area will begin to heal and then declined.  She reports mild to moderate drainage, serosanguineous.  Patient reports intermittent pain in the area of wound with activity and while resting.  She denies any significant swelling or  redness at the site of injury however has mild bilateral lower extremity swelling.  Patient initially was using nonstick dressing over the area after cleaning with warm soapy water.  Recently she was advised to leave the area open to air.  She had a slight increase in redness in the area and was prescribed doxycycline by her primary care physician on 1/7/2025 and referred to wound care for further evaluation and management.  Patient denies nausea, vomiting, fever, chills, shortness of breat  The following portions of the patient's history were reviewed and updated as appropriate:   There is no problem list on file for this patient.    Past Medical History:   Diagnosis Date    Diabetes mellitus (HCC)     Diverticulitis     GERD (gastroesophageal reflux disease)     Hypertension      Past Surgical History:   Procedure Laterality Date    CHOLECYSTECTOMY      TONSILLECTOMY       Social History     Socioeconomic History    Marital status:      Spouse name: Not on file    Number of children: Not on file    Years of education: Not on file    Highest education level: Not on file   Occupational History    Not on file   Tobacco Use    Smoking status: Never    Smokeless tobacco: Never   Vaping Use    Vaping status: Never Used   Substance and Sexual Activity    Alcohol use: Not on file    Drug use: Not on file    Sexual activity: Not on file   Other Topics Concern    Not on file   Social History Narrative    Not on file     Social Drivers of Health     Financial Resource Strain: Not At Risk (4/15/2025)    Received from [facility]    Financial Insecurity     In the last 12 months did you skip medications to save money?: No     In the last 12 months was there a time when you needed to see a doctor but could not because of cost?: No   Food Insecurity: No Food Insecurity (4/15/2025)    Received from [facility]    Food Insecurity     In the last 12 months did you ever eat less than you felt  you should because there wasn't enough money for food?: No   Transportation Needs: No Transportation Needs (4/15/2025)    Received from [facility]    Transportation Needs     In the last 12 months have you ever had to go without healthcare because you didn't have a way to get there?: No   Physical Activity: Not on file   Stress: No Stress Concern Present (8/23/2023)    Received from [facility] [address]    [place] [employer] - Occupational Stress Questionnaire     Feeling of Stress : Not at all   Social Connections: Socially Integrated (4/15/2025)    Received from [facility] [address]    Social Connection     Do you often feel lonely?: No   Intimate Partner Violence: Not At Risk (8/23/2023)    Received from [facility] [address] [facility]    Humiliation, Afraid, Rape, and Kick questionnaire     Fear of Current or Ex-Partner: No     Emotionally Abused: No     Physically Abused: No     Sexually Abused: No   Housing Stability: Not At Risk (4/15/2025)    Received from [facility]    Housing Stability     Are you worried that in the next 2 months you may not have stable housing?: No        Current Outpatient Medications:     Ascorbic Acid (vitamin C) 100 MG tablet, Take 100 mg by mouth daily, Disp: , Rfl:     aspirin (ECOTRIN LOW STRENGTH) 81 mg EC tablet, Take 81 mg by mouth daily, Disp: , Rfl:     Blood Glucose Monitoring Suppl (FreeStyle Lite) w/Device KIT, USE AS DIRECTED E11.9, Disp: , Rfl:     Cholecalciferol 25 MCG (1000 UT) capsule, Take 5,000 Units by mouth daily, Disp: , Rfl:     esomeprazole (NexIUM) 40 MG capsule, Take 40 mg by mouth if needed, Disp: , Rfl:     FREESTYLE LITE test strip, USE 1 STRIP TO CHECK GLUCOSE ONCE DAILY, Disp: , Rfl:     ibuprofen (MOTRIN) 200 mg tablet, Take 200 mg by mouth every 6 (six) hours as needed, Disp: , Rfl:     meloxicam (MOBIC) 15 mg tablet, Take 15 mg by mouth if needed, Disp: , Rfl:     metFORMIN  (GLUCOPHAGE-XR) 500 mg 24 hr tablet, Take 500 mg by mouth daily with breakfast, Disp: , Rfl:     metoprolol succinate (TOPROL-XL) 50 mg 24 hr tablet, Take 50 mg by mouth daily, Disp: , Rfl:     Multiple Vitamins-Minerals (CENTRUM SILVER PO), Take by mouth, Disp: , Rfl:     mupirocin (BACTROBAN) 2 % ointment, Apply topically daily, Disp: 30 g, Rfl: 1    olmesartan-hydrochlorothiazide (BENICAR HCT) 20-12.5 MG per tablet, Take 1 tablet by mouth daily, Disp: , Rfl:   No current facility-administered medications for this visit.  No family history on file.   Review of Systems  Constitutional:  Negative for chills and fever.   Respiratory:  Negative for chest tightness and shortness of breath.    Cardiovascular:  Positive for leg swelling.   Musculoskeletal:  Positive for arthralgias, back pain and gait problem.   Skin:  Positive for wound.     Objective:  /80   Pulse 80   Temp (!) 96.2 °F (35.7 °C)   Resp 18     Physical Exam    Constitutional:       General: She is not in acute distress.     Appearance: She is not ill-appearing.   Cardiovascular:      Comments: Left DP pulse 1/4, PT pulse 0/4  CRT less than 3 seconds  Absent hair growth to distal digits  Mild nonpitting edema to bilateral lower extremities  Pulmonary:      Effort: No respiratory distress.   Musculoskeletal:      Comments: MMT 5/5 at level of ankle bilaterally  No pain with palpitation of posterior calves bilaterally   Skin:     Comments:   Left anterior lateral leg traumatic wound nearly epithelialized with overlying dry skin and small area of granular base. Area fo rash noted in fran wound. No significant erythema.  No significant localized edema.  Mild tenderness with direct palpation.  No crepitus, no fluctuance, no malodor.  No drainage     Right lower extremity distal anterior lateral traumatic wound.  Fibrogranular base with hypergranulation tissue.  Surrounding inflammation without acute erythema.  No significant edema.  Nontender with  palpation.  no serous drainage expressed.  No purulence expressed, no crepitus, no fluctuance, no malodor  Wound 01/09/25 Traumatic Leg Left;Lower;Lateral (Active)   Wound Image   04/29/25 1303   Wound Description Pink;White 04/29/25 1305   Non-staged Wound Description Full thickness 04/29/25 1305   Wound Length (cm) 1 cm 04/29/25 1305   Wound Width (cm) 0.1 cm 04/29/25 1305   Wound Depth (cm) 0.1 cm 04/29/25 1305   Wound Surface Area (cm^2) 0.1 cm^2 04/29/25 1305   Wound Volume (cm^3) 0.01 cm^3 04/29/25 1305   Calculated Wound Volume (cm^3) 0.01 cm^3 04/29/25 1305   Change in Wound Size % 95.83 04/29/25 1305   Drainage Amount Scant 04/29/25 1305   Drainage Description Serosanguineous 04/29/25 1305   Sabrina-wound Assessment Pink;Edema 04/29/25 1305       Wound 02/18/25 Leg Right;Lateral;Lower (Active)   Wound Image   04/29/25 1304   Wound Description Yellow;Slough;Pink 04/29/25 1304   Non-staged Wound Description Full thickness 04/29/25 1304   Wound Length (cm) 0.8 cm 04/29/25 1304   Wound Width (cm) 0.8 cm 04/29/25 1304   Wound Depth (cm) 0.1 cm 04/29/25 1304   Wound Surface Area (cm^2) 0.64 cm^2 04/29/25 1304   Wound Volume (cm^3) 0.064 cm^3 04/29/25 1304   Calculated Wound Volume (cm^3) 0.06 cm^3 04/29/25 1304   Change in Wound Size % -20 04/29/25 1304   Drainage Amount Small 04/29/25 1304   Drainage Description Serosanguineous 04/29/25 1304   Sabrina-wound Assessment Scar Tissue;Edema 04/29/25 1304                         Debridement     Date/Time: 4/29/2025 1:00 PM  Universal Protocol:  procedure performed by consultantConsent: Verbal consent obtained.  Risks and benefits: risks, benefits and alternatives were discussed  Consent given by: patient  Patient understanding: patient states understanding of the procedure being performed  Patient identity confirmed: verbally with patient    Debridement Details  Performed by: physician  Debridement type: selective  Pain control: lidocaine 4%    Post-debridement  measurements  Length (cm): 0.8  Width (cm): 0.8  Depth (cm): 0.1  Percent debrided: 100%  Surface Area (cm^2): 0.64  Area Debrided (cm^2): 0.64  Volume (cm^3): 0.06    Devitalized tissue debrided: fibrin and slough  Instrument(s) utilized: curette  Technique utilized: nonexcisional  Bleeding: small  Hemostasis obtained with: pressure  Procedural pain (0-10): 1  Post-procedural pain: 0   Response to treatment: procedure was tolerated well                 Wound Instructions:  Orders Placed This Encounter   Procedures    Wound cleansing and dressings     Wash your hands with soap and water.  Remove old dressing, discard into plastic bag and place in trash.  Cleanse the wound with mild soap and water  prior to applying a clean dressing. Do not use tissue or cotton balls. Do not scrub the wound. Pat dry using gauze.  Shower no do not get dressings wet may cover with cast cover purchase at pharmacy or [facility]      Apply steroid cream to left leg fran wound only    Apply puracol to right leg wound. Apply dermagran to left leg wound Cover with silicon bordered foam  Secure with roel and tape  Change dressing every other day      Elastic Tubular Stocking: Spandagrip F     Tubular elastic bandage: Apply from base of toes to behind the knee. Apply in AM, may remove for sleep.  Avoid prolonged standing in one place.  Elevate leg(s) above the level of the heart when sitting or as much as possible.        Please try to consume 3-4 servings of protein (30g) each day.   - Each serving of protein should contain about 30 mg protein.   - Good sources of protein include, lean meats (fish, chicken, etc), eggs, dairy products (yogurt, cheese), tofu, legumes (chickpeas, lentils, peas, black beans), nuts (cashew, walnut, peanuts, etc), & quinoa.       If you develop fever, chills, nausea or vomiting, increase in drainage or foul smelling drainage go to the closest emergency department for evaluation.          Follow up in 2 weeks      "Standing Status:   Future     Expiration Date:   5/6/2025    Wound Procedure Treatment Traumatic Left;Lower;Lateral Leg     This order was created via procedure documentation    Wound Procedure Treatment Right;Lateral;Lower Leg     This order was created via procedure documentation         Jessica Azevedo DPM      Portions of the record may have been created with voice recognition software. Occasional wrong word or \"sound a like\" substitutions may have occurred due to the inherent limitations of voice recognition software. Read the chart carefully and recognize, using context, where substitutions have occurred.      "

## 2025-05-01 ENCOUNTER — TELEPHONE (OUTPATIENT)
Age: 80
End: 2025-05-01

## 2025-05-01 DIAGNOSIS — L30.9 DERMATITIS: Primary | ICD-10-CM

## 2025-05-01 RX ORDER — TRIAMCINOLONE ACETONIDE 1 MG/G
CREAM TOPICAL 2 TIMES DAILY
Qty: 15 G | Refills: 2 | Status: SHIPPED | OUTPATIENT
Start: 2025-05-01

## 2025-05-01 NOTE — TELEPHONE ENCOUNTER
Pt under care of:  Roberto WRIGHT   Office Location: [place]     Insurance   Current Insurance?medicare/hop   Insurance E-verified? yes      History  Last Seen 06/25/24 (include Follow Up recommendations of last visit- see Office Visit - Instructions): followup 1 yr with u/s kidney/bladder     Pt calling due to:Patient called stating she needs an updated U/S kidney/bladder order mailed to her home for her to do prior to her appointment.  She goes to [facility] closer to her home .        Appointment Details   Date:  06/25/25    Time: 920 am      Location:  [place]     Provider:  Roberto WRIGHT    Does the appointment need further review?       Pt can be reached at:  
Ultrasound order mailed to patient.  
as per mother, patient has been c/o headaches and stomach aches for a few days. had a negative covid test at her pmd.

## 2025-05-01 NOTE — TELEPHONE ENCOUNTER
Caller: Kim     Doctor and/or Office: Dr. Azevedo /[facility]    CB#: [phone]     Escalation: Medication Patient called in and stated that you where going to call something into the pharmacy for her. She said she went there today and there is no script for her. Please advise thank you

## 2025-05-13 ENCOUNTER — OFFICE VISIT (OUTPATIENT)
Dept: WOUND CARE | Facility: CLINIC | Age: 80
End: 2025-05-13
Payer: MEDICARE

## 2025-05-13 VITALS
RESPIRATION RATE: 18 BRPM | DIASTOLIC BLOOD PRESSURE: 60 MMHG | SYSTOLIC BLOOD PRESSURE: 126 MMHG | HEART RATE: 60 BPM | TEMPERATURE: 97.1 F

## 2025-05-13 DIAGNOSIS — S81.801A TRAUMATIC OPEN WOUND OF RIGHT LOWER LEG, INITIAL ENCOUNTER: Primary | ICD-10-CM

## 2025-05-13 PROCEDURE — 97597 DBRDMT OPN WND 1ST 20 CM/<: CPT | Performed by: PODIATRIST

## 2025-05-13 RX ORDER — LIDOCAINE 40 MG/G
CREAM TOPICAL ONCE
Status: COMPLETED | OUTPATIENT
Start: 2025-05-13 | End: 2025-05-13

## 2025-05-13 RX ADMIN — LIDOCAINE: 40 CREAM TOPICAL at 14:03

## 2025-05-13 NOTE — PROGRESS NOTES
Patient ID: Kim Kwon is a 79 y.o. female Date of Birth 1945       Chief Complaint   Patient presents with    Follow Up Wound Care Visit       Allergies:  Codeine    Diagnosis:  1. Traumatic open wound of right lower leg, initial encounter  -     Wound cleansing and dressings; Future  -     lidocaine (LMX) 4 % cream  -     Wound Procedure Treatment Right;Lateral;Lower Leg     Diagnosis ICD-10-CM Associated Orders   1. Traumatic open wound of right lower leg, initial encounter  S81.801A Wound cleansing and dressings     lidocaine (LMX) 4 % cream     Wound Procedure Treatment Right;Lateral;Lower Leg           Assessment & Plan:  See wound orders.  Dermagran to right lower extremity     -Left leg lateral traumatic wound healed at today's evaluation  -Right distal anterior lateral leg wound, measuring smaller.  Selective debridement performed. No soi   - Dermagran to right lower extremity  -DM2 A1c 6.6% 12/2024  -Patient counseled on importance of lower extremity elevation for decrease in edema  -Continue Spendagrip compression bilaterally to help with lower extremity edema  -Patient counseled on importance of strict glycemic control and high-protein diet  -Exercise regularly to help with lower extremity edema   -Patient counseled on clinical signs of infection present to the ED if these occur  -Follow-up 2 weeks    Subjective:   HPI  5/13/2025 bilateral lower extremity traumatic wounds.  Patient states left lower extremity wound appears healed.  Patient is still treating right lower extremity wound.  She denies any redness, swelling, drainage.  She denies any clinical signs of infection    4/29/25 patient presents for continued evaluation and management of bilateral lower extremity wounds.  Left leg traumatic wound and right lower extremity wound.  Patient denies any increase in redness, swelling, or drainage.  She has been wearing the lower extremity compression as directed     4/15/25 left leg traumatic  wound almost healed.  Right lower extremity wound still present.  Patient denies any systemic signs of infection.  Patient states she does have itching sensation around right leg wound at times.  She denies any increase in drainage.  She denies any increase in redness or swelling.     4/1/25 patient presents for continued evaluation and management of bilateral lower extremity wounds.  Patient states she has had almost no drainage from the left lower extremity wound and only minimal drainage from the right.  She denies any redness, swelling, purulence, or pain increase.  She has been keeping areas dry and has been adherent to wound care.     3/18/2025 patient presents for continued evaluation and management of bilateral lower extremity wounds.  Patient states she has been compliant with wound care.  She denies any redness, swelling, or significant drainage.  Patient denies any pain in the area.  She denies any systemic signs of infection such as nausea, vomiting, fever, chills, shortness of breath        3/4/2025 patient presents for continued evaluation of left leg traumatic wound and right leg wound.  Patient states she has been adherent to dressing changes.  She denies any increase in redness or swelling.  She denies any changes in drainage.  Patient states she has been adherent to wound care.  She denies nausea, vomiting, fever, chills, shortness of breath     2/25/2025 patient presents for evaluation management of left anterior lateral leg wound and right distal lateral leg wound.  Patient has been adherent with dressing changes and compression.  She denies any increase in pain, swelling, or drainage.  She denies any nausea, vomiting, fever, chills, shortness of breath     2/18/2025 patient presents for evaluation and management of left anterior lateral leg wound of traumatic origin.  She has been adherent to wound care.  She denies any redness, swelling, drainage changes, or pain.  Patient reports a new wound  on the right anterior lateral leg.  She states she hit her leg over a month ago and a scab had been present.  The scab fell off with underlying wound being noted.  She states the area has mild inflammation but she denies any purulence, swelling, or significant pain.  Denies nausea, vomiting, fever, chills, shortness of breath     2/4/2025 patient presents for further evaluation and management of left anterior lateral leg wound of traumatic origin.  Patient has been compliant with dressing changes, compression, and elevation.  She denies any pain, swelling, redness in the area.  She denies any systemic signs of infection such as nausea, vomiting, fever, chills, shortness of breath     1/16/2025 patient presents today for further evaluation and management of left anterior lateral leg traumatic wound sustained in October with delayed healing.  Patient has been adherent to wound care.  She denies any significant changes in wound.  Today she completes her oral doxycycline that was prescribed by her PCP.  Denies any clinical signs of infection.  Patient states she had her vascular test performed yesterday afternoon however she has not received the results and I have not received results.  Patient denies nausea, vomiting, fever, chills, shortness of breath        1/9/2025 Patient presents for evaluation and management of left anterior leg traumatic wound delayed healing.  Injury was sustained in October and has been managed by her primary care physician.  She states the area will begin to heal and then declined.  She reports mild to moderate drainage, serosanguineous.  Patient reports intermittent pain in the area of wound with activity and while resting.  She denies any significant swelling or redness at the site of injury however has mild bilateral lower extremity swelling.  Patient initially was using nonstick dressing over the area after cleaning with warm soapy water.  Recently she was advised to leave the area open  to air.  She had a slight increase in redness in the area and was prescribed doxycycline by her primary care physician on 1/7/2025 and referred to wound care for further evaluation and management.  Patient denies nausea, vomiting, fever, chills, shortness of breat      The following portions of the patient's history were reviewed and updated as appropriate:   There is no problem list on file for this patient.    Past Medical History:   Diagnosis Date    Diabetes mellitus (HCC)     Diverticulitis     GERD (gastroesophageal reflux disease)     Hypertension      Past Surgical History:   Procedure Laterality Date    CHOLECYSTECTOMY      TONSILLECTOMY       Social History     Socioeconomic History    Marital status:      Spouse name: Not on file    Number of children: Not on file    Years of education: Not on file    Highest education level: Not on file   Occupational History    Not on file   Tobacco Use    Smoking status: Never    Smokeless tobacco: Never   Vaping Use    Vaping status: Never Used   Substance and Sexual Activity    Alcohol use: Not on file    Drug use: Not on file    Sexual activity: Not on file   Other Topics Concern    Not on file   Social History Narrative    Not on file     Social Drivers of Health     Financial Resource Strain: Not At Risk (4/15/2025)    Received from [facility]    Financial Insecurity     In the last 12 months did you skip medications to save money?: No     In the last 12 months was there a time when you needed to see a doctor but could not because of cost?: No   Food Insecurity: No Food Insecurity (4/15/2025)    Received from [facility]    Food Insecurity     In the last 12 months did you ever eat less than you felt you should because there wasn't enough money for food?: No   Transportation Needs: No Transportation Needs (4/15/2025)    Received from [facility]    Transportation Needs     In the last 12 months have you  ever had to go without healthcare because you didn't have a way to get there?: No   Physical Activity: Not on file   Stress: No Stress Concern Present (8/23/2023)    Received from [facility]    [employer] - Occupational Stress Questionnaire     Feeling of Stress : Not at all   Social Connections: Socially Integrated (4/15/2025)    Received from [facility]    Social Connection     Do you often feel lonely?: No   Intimate Partner Violence: Not At Risk (8/23/2023)    Received from [facility]    Humiliation, Afraid, Rape, and Kick questionnaire     Fear of Current or Ex-Partner: No     Emotionally Abused: No     Physically Abused: No     Sexually Abused: No   Housing Stability: Not At Risk (4/15/2025)    Received from [facility]    Housing Stability     Are you worried that in the next 2 months you may not have stable housing?: No        Current Outpatient Medications:     Ascorbic Acid (vitamin C) 100 MG tablet, Take 100 mg by mouth daily, Disp: , Rfl:     aspirin (ECOTRIN LOW STRENGTH) 81 mg EC tablet, Take 81 mg by mouth daily, Disp: , Rfl:     Blood Glucose Monitoring Suppl (FreeStyle Lite) w/Device KIT, USE AS DIRECTED E11.9, Disp: , Rfl:     Cholecalciferol 25 MCG (1000 UT) capsule, Take 5,000 Units by mouth daily, Disp: , Rfl:     esomeprazole (NexIUM) 40 MG capsule, Take 40 mg by mouth if needed, Disp: , Rfl:     FREESTYLE LITE test strip, USE 1 STRIP TO CHECK GLUCOSE ONCE DAILY, Disp: , Rfl:     ibuprofen (MOTRIN) 200 mg tablet, Take 200 mg by mouth every 6 (six) hours as needed, Disp: , Rfl:     meloxicam (MOBIC) 15 mg tablet, Take 15 mg by mouth if needed, Disp: , Rfl:     metFORMIN (GLUCOPHAGE-XR) 500 mg 24 hr tablet, Take 500 mg by mouth daily with breakfast, Disp: , Rfl:     metoprolol succinate (TOPROL-XL) 50 mg 24 hr tablet, Take 50 mg by mouth daily, Disp: , Rfl:     Multiple Vitamins-Minerals (CENTRUM  SILVER PO), Take by mouth, Disp: , Rfl:     mupirocin (BACTROBAN) 2 % ointment, Apply topically daily, Disp: 30 g, Rfl: 1    olmesartan-hydrochlorothiazide (BENICAR HCT) 20-12.5 MG per tablet, Take 1 tablet by mouth daily, Disp: , Rfl:     triamcinolone (KENALOG) 0.1 % cream, Apply topically 2 (two) times a day, Disp: 15 g, Rfl: 2  No current facility-administered medications for this visit.  No family history on file.   Review of Systems  Constitutional:  Negative for chills and fever.   Respiratory:  Negative for chest tightness and shortness of breath.    Cardiovascular:  Positive for leg swelling.   Musculoskeletal:  Positive for arthralgias, back pain and gait problem.   Skin:  Positive for wound.     Objective:  /60   Pulse 60   Temp (!) 97.1 °F (36.2 °C)   Resp 18     Physical Exam  Constitutional:       General: She is not in acute distress.     Appearance: She is not ill-appearing.   Cardiovascular:      Comments: Left DP pulse 1/4, PT pulse 0/4  CRT less than 3 seconds  Absent hair growth to distal digits  Mild nonpitting edema to bilateral lower extremities  Pulmonary:      Effort: No respiratory distress.   Musculoskeletal:      Comments: MMT 5/5 at level of ankle bilaterally  No pain with palpitation of posterior calves bilaterally   Skin:     Comments:   Left anterior lateral leg traumatic wound healed at today's evaluation    Right lower extremity distal anterior lateral traumatic wound.  Fibrogranular base, hypergranulation resolved.  No erythema.  No significant edema.  Nontender with palpation.  no serous drainage expressed.  No purulence expressed, no crepitus, no fluctuance, no malodor      Wound 01/09/25 Traumatic Leg Left;Lower;Lateral (Active)   Wound Image   05/13/25 1336   Wound Description Epithelialization 05/13/25 1336   Non-staged Wound Description Full thickness 04/29/25 1305   Wound Length (cm) 0 cm 05/13/25 1336   Wound Width (cm) 0 cm 05/13/25 1336   Wound Depth (cm) 0 cm  "05/13/25 1336   Wound Surface Area (cm^2) 0 cm^2 05/13/25 1336   Wound Volume (cm^3) 0 cm^3 05/13/25 1336   Calculated Wound Volume (cm^3) 0 cm^3 05/13/25 1336   Change in Wound Size % 100 05/13/25 1336   Drainage Amount None 05/13/25 1336   Drainage Description Serosanguineous 04/29/25 1305   Sabrina-wound Assessment Pink;Edema 04/29/25 1305       Wound 02/18/25 Leg Right;Lateral;Lower (Active)   Wound Image   05/13/25 1337   Wound Description Yellow;Slough;Pink 05/13/25 1337   Non-staged Wound Description Full thickness 05/13/25 1337   Wound Length (cm) 0.4 cm 05/13/25 1337   Wound Width (cm) 0.3 cm 05/13/25 1337   Wound Depth (cm) 0.1 cm 05/13/25 1337   Wound Surface Area (cm^2) 0.12 cm^2 05/13/25 1337   Wound Volume (cm^3) 0.012 cm^3 05/13/25 1337   Calculated Wound Volume (cm^3) 0.01 cm^3 05/13/25 1337   Change in Wound Size % 80 05/13/25 1337   Drainage Amount Scant 05/13/25 1337   Drainage Description Serosanguineous 05/13/25 1337   Sabrina-wound Assessment Scar Tissue;Edema 05/13/25 1337                         Debridement     Date/Time: 5/13/2025 1:30 PM    Universal Protocol:  procedure performed by consultantConsent: Verbal consent obtained.  Risks and benefits: risks, benefits and alternatives were discussed  Consent given by: patient  Time out: Immediately prior to procedure a \"time out\" was called to verify the correct patient, procedure, equipment, support staff and site/side marked as required.  Patient understanding: patient states understanding of the procedure being performed  Patient identity confirmed: verbally with patient    Debridement Details  Performed by: physician  Debridement type: selective  Pain control: lidocaine 4%    Post-debridement measurements  Length (cm): 0.4  Width (cm): 0.3  Depth (cm): 0.1  Percent debrided: 100%  Surface Area (cm^2): 0.12  Area Debrided (cm^2): 0.12  Volume (cm^3): 0.01    Devitalized tissue debrided: fibrin  Instrument(s) utilized: curette  Technique utilized: " nonexcisional  Bleeding: none  Hemostasis obtained with: not applicable  Procedural pain (0-10): 2  Post-procedural pain: 0   Response to treatment: procedure was tolerated well                 Wound Instructions:  Orders Placed This Encounter   Procedures    Wound cleansing and dressings     Left leg is healed please apply Aquaphor to area daily  Wound cleansing and dressings       Wash your hands with soap and water.  Remove old dressing, discard into plastic bag and place in trash.  Cleanse the wound with mild soap and water  prior to applying a clean dressing. Do not use tissue or cotton balls. Do not scrub the wound. Pat dry using gauze.  Shower no do not get dressings wet may cover with cast cover purchase at [employer] or [employer]      Apply Dermagran  right leg wound.    Cover with bordered gauze  Change dressing every other day      Elastic Tubular Stocking: Spandagrip F     Tubular elastic bandage: Apply from base of toes to behind the knee. Apply in AM, may remove for sleep.  Avoid prolonged standing in one place.  Elevate leg(s) above the level of the heart when sitting or as much as possible.        Please try to consume 3-4 servings of protein (30g) each day.   - Each serving of protein should contain about 30 mg protein.   - Good sources of protein include, lean meats (fish, chicken, etc), eggs, dairy products (yogurt, cheese), tofu, legumes (chickpeas, lentils, peas, black beans), nuts (cashew, walnut, peanuts, etc), & quinoa.       If you develop fever, chills, nausea or vomiting, increase in drainage or foul smelling drainage go to the closest emergency department for evaluation.          Follow up in 2 weeks     Standing Status:   Future     Expiration Date:   5/20/2025    Wound Procedure Treatment Right;Lateral;Lower Leg     This order was created via procedure documentation         Jessica Azevedo DPM      Portions of the record may have been created with voice recognition software. Occasional  "wrong word or \"sound a like\" substitutions may have occurred due to the inherent limitations of voice recognition software. Read the chart carefully and recognize, using context, where substitutions have occurred.     "

## 2025-05-13 NOTE — PATIENT INSTRUCTIONS
Orders Placed This Encounter   Procedures    Wound cleansing and dressings     Left leg is healed please apply Aquaphor to area daily  Wound cleansing and dressings       Wash your hands with soap and water.  Remove old dressing, discard into plastic bag and place in trash.  Cleanse the wound with mild soap and water  prior to applying a clean dressing. Do not use tissue or cotton balls. Do not scrub the wound. Pat dry using gauze.  Shower no do not get dressings wet may cover with cast cover purchase at [employer] or [employer]      Apply Dermagran  right leg wound.    Cover with bordered gauze  Change dressing every other day      Elastic Tubular Stocking: Spandagrip F     Tubular elastic bandage: Apply from base of toes to behind the knee. Apply in AM, may remove for sleep.  Avoid prolonged standing in one place.  Elevate leg(s) above the level of the heart when sitting or as much as possible.        Please try to consume 3-4 servings of protein (30g) each day.   - Each serving of protein should contain about 30 mg protein.   - Good sources of protein include, lean meats (fish, chicken, etc), eggs, dairy products (yogurt, cheese), tofu, legumes (chickpeas, lentils, peas, black beans), nuts (cashew, walnut, peanuts, etc), & quinoa.       If you develop fever, chills, nausea or vomiting, increase in drainage or foul smelling drainage go to the closest emergency department for evaluation.          Follow up in 2 weeks     Standing Status:   Future     Expiration Date:   5/20/2025

## 2025-05-13 NOTE — PROGRESS NOTES
Wound Procedure Treatment Right;Lateral;Lower Leg    Performed by: Antonia Conrad RN  Authorized by: Jessica Azevedo DPM  Associated wounds:   Wound 02/18/25 Leg Right;Lateral;Lower    Wound cleansed with:  NSS   Applied primary dressing:  Dermagran   Applied secondary dressing:  Gauze   Dressing secured with:  Elastic tubular stocking and Size F   Comments:  Bordered

## 2025-05-27 ENCOUNTER — OFFICE VISIT (OUTPATIENT)
Dept: WOUND CARE | Facility: CLINIC | Age: 80
End: 2025-05-27
Payer: MEDICARE

## 2025-05-27 VITALS
SYSTOLIC BLOOD PRESSURE: 124 MMHG | TEMPERATURE: 97.3 F | RESPIRATION RATE: 18 BRPM | HEART RATE: 76 BPM | DIASTOLIC BLOOD PRESSURE: 70 MMHG

## 2025-05-27 DIAGNOSIS — S81.801A TRAUMATIC OPEN WOUND OF RIGHT LOWER LEG, INITIAL ENCOUNTER: Primary | ICD-10-CM

## 2025-05-27 PROCEDURE — 99213 OFFICE O/P EST LOW 20 MIN: CPT | Performed by: PODIATRIST

## 2025-05-27 PROCEDURE — 99212 OFFICE O/P EST SF 10 MIN: CPT | Performed by: PODIATRIST

## 2025-05-27 RX ORDER — LIDOCAINE 40 MG/G
CREAM TOPICAL ONCE
Status: COMPLETED | OUTPATIENT
Start: 2025-05-27 | End: 2025-05-27

## 2025-05-27 RX ADMIN — LIDOCAINE 1 APPLICATION: 40 CREAM TOPICAL at 11:26

## 2025-05-27 NOTE — PATIENT INSTRUCTIONS
Orders Placed This Encounter   Procedures    Wound cleansing and dressings Right;Lateral;Lower Leg     Wound cleansing and dressings            Wash your hands with soap and water.  Remove old dressing, discard into plastic bag and place in trash.  Cleanse the wound with mild soap and water  prior to applying a clean dressing. Do not use tissue or cotton balls. Do not scrub the wound. Pat dry using gauze.  Shower no do not get dressings wet may cover with cast cover purchase at [employer] or [employer]      Apply Dermagran  right leg wound.    Cover with bordered gauze  Change dressing every other day      Elastic Tubular Stocking: Spandagrip F     Tubular elastic bandage: Apply from base of toes to behind the knee. Apply in AM, may remove for sleep.  Avoid prolonged standing in one place.  Elevate leg(s) above the level of the heart when sitting or as much as possible.        Please try to consume 3-4 servings of protein (30g) each day.   - Each serving of protein should contain about 30 mg protein.   - Good sources of protein include, lean meats (fish, chicken, etc), eggs, dairy products (yogurt, cheese), tofu, legumes (chickpeas, lentils, peas, black beans), nuts (cashew, walnut, peanuts, etc), & quinoa.       If you develop fever, chills, nausea or vomiting, increase in drainage or foul smelling drainage go to the closest emergency department for evaluation.          Follow up in 2 weeks     Standing Status:   Future     Expiration Date:   6/3/2025    Wound Procedure Treatment Right;Lateral;Lower Leg     This order was created via procedure documentation

## 2025-05-27 NOTE — PROGRESS NOTES
Wound Procedure Treatment Right;Lateral;Lower Leg    Performed by: Hilary Garg RN  Authorized by: Jessica Azevedo DPM  Associated wounds:   Wound 02/18/25 Leg Right;Lateral;Lower    Applied primary dressing:  Dermagran   Dressing secured with:  Elastic tubular stocking and Size F   Comments:  [employer]

## 2025-05-27 NOTE — PROGRESS NOTES
Patient ID: Kim Kwon is a 79 y.o. female Date of Birth 1945       Chief Complaint   Patient presents with    Follow Up Wound Care Visit       Allergies:  Codeine    Diagnosis:  1. Traumatic open wound of right lower leg, initial encounter  -     Wound cleansing and dressings Right;Lateral;Lower Leg; Future  -     lidocaine (LMX) 4 % cream  -     Wound Procedure Treatment Right;Lateral;Lower Leg     Diagnosis ICD-10-CM Associated Orders   1. Traumatic open wound of right lower leg, initial encounter  S81.801A Wound cleansing and dressings Right;Lateral;Lower Leg     lidocaine (LMX) 4 % cream     Wound Procedure Treatment Right;Lateral;Lower Leg           Assessment & Plan:  See wound orders.    Dermagran to right lower extremity       -Right distal anterior lateral leg wound, similar size however wound bed healthier without fibrotic tissue.  No debridement performed no soi   -Continue with Dermagran to right lower extremity  -Continue with the left lower extremity scar massage and area of prior wound  -DM2 A1c 6.6% 12/2024  -Patient counseled on importance of lower extremity elevation for decrease in edema  -Continue Spendagrip compression bilaterally to help with lower extremity edema  -Patient counseled on importance of strict glycemic control and high-protein diet  -Exercise regularly to help with lower extremity edema   -Patient counseled on clinical signs of infection present to the ED if these occur  -Follow-up 2 weeks    Subjective:   HPI    5/27/2025 patient presents for evaluation of right lower extremity traumatic wound.  Patient has been adherent to wound care.  She has been adherent to compression and elevation.  She denies any increase in redness, swelling, or drainage.  She denies any systemic signs of infection    5/13/2025 bilateral lower extremity traumatic wounds.  Patient states left lower extremity wound appears healed.  Patient is still treating right lower extremity wound.  She denies  any redness, swelling, drainage.  She denies any clinical signs of infection     4/29/25 patient presents for continued evaluation and management of bilateral lower extremity wounds.  Left leg traumatic wound and right lower extremity wound.  Patient denies any increase in redness, swelling, or drainage.  She has been wearing the lower extremity compression as directed     4/15/25 left leg traumatic wound almost healed.  Right lower extremity wound still present.  Patient denies any systemic signs of infection.  Patient states she does have itching sensation around right leg wound at times.  She denies any increase in drainage.  She denies any increase in redness or swelling.     4/1/25 patient presents for continued evaluation and management of bilateral lower extremity wounds.  Patient states she has had almost no drainage from the left lower extremity wound and only minimal drainage from the right.  She denies any redness, swelling, purulence, or pain increase.  She has been keeping areas dry and has been adherent to wound care.     3/18/2025 patient presents for continued evaluation and management of bilateral lower extremity wounds.  Patient states she has been compliant with wound care.  She denies any redness, swelling, or significant drainage.  Patient denies any pain in the area.  She denies any systemic signs of infection such as nausea, vomiting, fever, chills, shortness of breath        3/4/2025 patient presents for continued evaluation of left leg traumatic wound and right leg wound.  Patient states she has been adherent to dressing changes.  She denies any increase in redness or swelling.  She denies any changes in drainage.  Patient states she has been adherent to wound care.  She denies nausea, vomiting, fever, chills, shortness of breath     2/25/2025 patient presents for evaluation management of left anterior lateral leg wound and right distal lateral leg wound.  Patient has been adherent with  dressing changes and compression.  She denies any increase in pain, swelling, or drainage.  She denies any nausea, vomiting, fever, chills, shortness of breath     2/18/2025 patient presents for evaluation and management of left anterior lateral leg wound of traumatic origin.  She has been adherent to wound care.  She denies any redness, swelling, drainage changes, or pain.  Patient reports a new wound on the right anterior lateral leg.  She states she hit her leg over a month ago and a scab had been present.  The scab fell off with underlying wound being noted.  She states the area has mild inflammation but she denies any purulence, swelling, or significant pain.  Denies nausea, vomiting, fever, chills, shortness of breath     2/4/2025 patient presents for further evaluation and management of left anterior lateral leg wound of traumatic origin.  Patient has been compliant with dressing changes, compression, and elevation.  She denies any pain, swelling, redness in the area.  She denies any systemic signs of infection such as nausea, vomiting, fever, chills, shortness of breath     1/16/2025 patient presents today for further evaluation and management of left anterior lateral leg traumatic wound sustained in October with delayed healing.  Patient has been adherent to wound care.  She denies any significant changes in wound.  Today she completes her oral doxycycline that was prescribed by her PCP.  Denies any clinical signs of infection.  Patient states she had her vascular test performed yesterday afternoon however she has not received the results and I have not received results.  Patient denies nausea, vomiting, fever, chills, shortness of breath        1/9/2025 Patient presents for evaluation and management of left anterior leg traumatic wound delayed healing.  Injury was sustained in October and has been managed by her primary care physician.  She states the area will begin to heal and then declined.  She reports  mild to moderate drainage, serosanguineous.  Patient reports intermittent pain in the area of wound with activity and while resting.  She denies any significant swelling or redness at the site of injury however has mild bilateral lower extremity swelling.  Patient initially was using nonstick dressing over the area after cleaning with warm soapy water.  Recently she was advised to leave the area open to air.  She had a slight increase in redness in the area and was prescribed doxycycline by her primary care physician on 1/7/2025 and referred to wound care for further evaluation and management.  Patient denies nausea, vomiting, fever, chills, shortness of breath    The following portions of the patient's history were reviewed and updated as appropriate:   Problem List[1]  Past Medical History[2]  Past Surgical History[3]  Social History[4]   Current Medications[5]  Family History[6]   Review of Systems  Constitutional:  Negative for chills and fever.   Respiratory:  Negative for chest tightness and shortness of breath.    Cardiovascular:  Positive for leg swelling.   Musculoskeletal:  Positive for arthralgias, back pain and gait problem.   Skin:  Positive for wound.       Objective:  /70   Pulse 76   Temp (!) 97.3 °F (36.3 °C)   Resp 18     Physical Exam  Constitutional:       General: She is not in acute distress.     Appearance: She is not ill-appearing.   Cardiovascular:      Comments: Left DP pulse 1/4, PT pulse 0/4  CRT less than 3 seconds  Absent hair growth to distal digits  Mild nonpitting edema to bilateral lower extremities  Pulmonary:      Effort: No respiratory distress.   Musculoskeletal:      Comments: MMT 5/5 at level of ankle bilaterally  No pain with palpitation of posterior calves bilaterally   Skin:     Comments:      Right lower extremity distal anterior lateral traumatic wound.  Granular base.  No erythema.  No significant edema.  Nontender with palpation.  no serous drainage expressed.   No purulence expressed, no crepitus, no fluctuance, no malodor                                                                           Wound 02/18/25 Leg Right;Lateral;Lower (Active)   Wound Image   05/27/25 1121   Wound Description Yellow;Pink 05/27/25 1125   Non-staged Wound Description Full thickness 05/27/25 1125   Wound Length (cm) 0.4 cm 05/27/25 1125   Wound Width (cm) 0.5 cm 05/27/25 1125   Wound Depth (cm) 0.1 cm 05/27/25 1125   Wound Surface Area (cm^2) 0.16 cm^2 05/27/25 1125   Wound Volume (cm^3) 0.01 cm^3 05/27/25 1125   Calculated Wound Volume (cm^3) 0.02 cm^3 05/27/25 1125   Change in Wound Size % 60 05/27/25 1125   Drainage Amount Scant 05/27/25 1125   Drainage Description Serosanguineous 05/27/25 1125   Sabrina-wound Assessment Scar Tissue;Edema 05/27/25 1125                         Procedures             Wound Instructions:  Orders Placed This Encounter   Procedures    Wound cleansing and dressings Right;Lateral;Lower Leg     Wound cleansing and dressings            Wash your hands with soap and water.  Remove old dressing, discard into plastic bag and place in trash.  Cleanse the wound with mild soap and water  prior to applying a clean dressing. Do not use tissue or cotton balls. Do not scrub the wound. Pat dry using gauze.  Shower no do not get dressings wet may cover with cast cover purchase at pharmacy or [employer]      Apply Dermagran  right leg wound.    Cover with bordered gauze  Change dressing every other day      Elastic Tubular Stocking: Spandagrip F     Tubular elastic bandage: Apply from base of toes to behind the knee. Apply in AM, may remove for sleep.  Avoid prolonged standing in one place.  Elevate leg(s) above the level of the heart when sitting or as much as possible.        Please try to consume 3-4 servings of protein (30g) each day.   - Each serving of protein should contain about 30 mg protein.   - Good sources of protein include, lean meats (fish, chicken, etc), eggs, dairy  "products (yogurt, cheese), tofu, legumes (chickpeas, lentils, peas, black beans), nuts (cashew, walnut, peanuts, etc), & quinoa.       If you develop fever, chills, nausea or vomiting, increase in drainage or foul smelling drainage go to the closest emergency department for evaluation.          Follow up in 2 weeks     Standing Status:   Future     Expiration Date:   6/3/2025    Wound Procedure Treatment Right;Lateral;Lower Leg     This order was created via procedure documentation         Jessica Azevedo DPM      Portions of the record may have been created with voice recognition software. Occasional wrong word or \"sound a like\" substitutions may have occurred due to the inherent limitations of voice recognition software. Read the chart carefully and recognize, using context, where substitutions have occurred.          [1] There is no problem list on file for this patient.   [2]   Past Medical History:  Diagnosis Date    Diabetes mellitus (HCC)     Diverticulitis     GERD (gastroesophageal reflux disease)     Hypertension    [3]   Past Surgical History:  Procedure Laterality Date    CHOLECYSTECTOMY      TONSILLECTOMY     [4]   Social History  Socioeconomic History    Marital status:    Tobacco Use    Smoking status: Never    Smokeless tobacco: Never   Vaping Use    Vaping status: Never Used     Social Drivers of Health     Financial Resource Strain: Not At Risk (4/15/2025)    Received from [facility]    Financial Insecurity     In the last 12 months did you skip medications to save money?: No     In the last 12 months was there a time when you needed to see a doctor but could not because of cost?: No   Food Insecurity: No Food Insecurity (4/15/2025)    Received from [facility]    Food Insecurity     In the last 12 months did you ever eat less than you felt you should because there wasn't enough money for food?: No   Transportation Needs: No Transportation Needs " (4/15/2025)    Received from [facility]    Transportation Needs     In the last 12 months have you ever had to go without healthcare because you didn't have a way to get there?: No   Stress: No Stress Concern Present (8/23/2023)    Received from [facility]    [place] citizen [place] [employer] - Occupational Stress Questionnaire     Feeling of Stress : Not at all   Social Connections: Socially Integrated (4/15/2025)    Received from [facility]    Social Connection     Do you often feel lonely?: No   Intimate Partner Violence: Not At Risk (8/23/2023)    Received from [facility]    Humiliation, Afraid, Rape, and Kick questionnaire     Fear of Current or Ex-Partner: No     Emotionally Abused: No     Physically Abused: No     Sexually Abused: No   Housing Stability: Not At Risk (4/15/2025)    Received from [facility]    Housing Stability     Are you worried that in the next 2 months you may not have stable housing?: No   [5]   Current Outpatient Medications:     Ascorbic Acid (vitamin C) 100 MG tablet, Take 100 mg by mouth daily, Disp: , Rfl:     aspirin (ECOTRIN LOW STRENGTH) 81 mg EC tablet, Take 81 mg by mouth daily, Disp: , Rfl:     Blood Glucose Monitoring Suppl (FreeStyle Lite) w/Device KIT, USE AS DIRECTED E11.9, Disp: , Rfl:     Cholecalciferol 25 MCG (1000 UT) capsule, Take 5,000 Units by mouth daily, Disp: , Rfl:     esomeprazole (NexIUM) 40 MG capsule, Take 40 mg by mouth if needed, Disp: , Rfl:     FREESTYLE LITE test strip, USE 1 STRIP TO CHECK GLUCOSE ONCE DAILY, Disp: , Rfl:     ibuprofen (MOTRIN) 200 mg tablet, Take 200 mg by mouth every 6 (six) hours as needed, Disp: , Rfl:     meloxicam (MOBIC) 15 mg tablet, Take 15 mg by mouth if needed, Disp: , Rfl:     metFORMIN (GLUCOPHAGE-XR) 500 mg 24 hr tablet, Take 500 mg by mouth daily with breakfast, Disp: , Rfl:     metoprolol succinate (TOPROL-XL) 50 mg 24 hr  tablet, Take 50 mg by mouth daily, Disp: , Rfl:     Multiple Vitamins-Minerals (CENTRUM SILVER PO), Take by mouth, Disp: , Rfl:     mupirocin (BACTROBAN) 2 % ointment, Apply topically daily, Disp: 30 g, Rfl: 1    olmesartan-hydrochlorothiazide (BENICAR HCT) 20-12.5 MG per tablet, Take 1 tablet by mouth daily, Disp: , Rfl:     triamcinolone (KENALOG) 0.1 % cream, Apply topically 2 (two) times a day, Disp: 15 g, Rfl: 2  No current facility-administered medications for this visit.  [6] No family history on file.

## 2025-06-10 ENCOUNTER — OFFICE VISIT (OUTPATIENT)
Dept: WOUND CARE | Facility: CLINIC | Age: 80
End: 2025-06-10
Payer: MEDICARE

## 2025-06-10 VITALS
DIASTOLIC BLOOD PRESSURE: 80 MMHG | RESPIRATION RATE: 18 BRPM | SYSTOLIC BLOOD PRESSURE: 138 MMHG | TEMPERATURE: 96.5 F | HEART RATE: 84 BPM

## 2025-06-10 DIAGNOSIS — S81.801A TRAUMATIC OPEN WOUND OF RIGHT LOWER LEG, INITIAL ENCOUNTER: Primary | ICD-10-CM

## 2025-06-10 PROCEDURE — 99213 OFFICE O/P EST LOW 20 MIN: CPT | Performed by: PODIATRIST

## 2025-06-10 RX ORDER — LIDOCAINE 40 MG/G
CREAM TOPICAL ONCE
Status: COMPLETED | OUTPATIENT
Start: 2025-06-10 | End: 2025-06-10

## 2025-06-10 RX ADMIN — LIDOCAINE: 40 CREAM TOPICAL at 11:26

## 2025-06-10 NOTE — PATIENT INSTRUCTIONS
Orders Placed This Encounter   Procedures    Wound cleansing and dressings Right;Lateral;Lower Leg     Right;Lateral;Lower Leg               Wash your hands with soap and water.  Remove old dressing, discard into plastic bag and place in trash.  Cleanse the wound with mild soap and water  prior to applying a clean dressing. Do not use tissue or cotton balls. Do not scrub the wound. Pat dry using gauze.  Shower no do not get dressings wet may cover with cast cover purchase at pharmacy or [employer]      Apply xeroform right leg wound.    Cover with bordered gauze  Change dressing every other day      Elastic Tubular Stocking: Spandagrip F     Tubular elastic bandage: Apply from base of toes to behind the knee. Apply in AM, may remove for sleep.  Avoid prolonged standing in one place.  Elevate leg(s) above the level of the heart when sitting or as much as possible.        Please try to consume 3-4 servings of protein (30g) each day.   - Each serving of protein should contain about 30 mg protein.   - Good sources of protein include, lean meats (fish, chicken, etc), eggs, dairy products (yogurt, cheese), tofu, legumes (chickpeas, lentils, peas, black beans), nuts (cashew, walnut, peanuts, etc), & quinoa.       If you develop fever, chills, nausea or vomiting, increase in drainage or foul smelling drainage go to the closest emergency department for evaluation.          Follow up in 2 weeks     Standing Status:   Future     Expected Date:   6/24/2025     Expiration Date:   6/24/2025    Wound Procedure Treatment Right;Lateral;Lower Leg     This order was created via procedure documentation

## 2025-06-10 NOTE — PROGRESS NOTES
Wound Procedure Treatment Right;Lateral;Lower Leg    Performed by: Shae Espinal RN  Authorized by: Jessica Azevedo DPM  Associated wounds:   Wound 02/18/25 Leg Right;Lateral;Lower    Wound cleansed with:  NSS   Applied primary dressing:  Non adherent contact layer and Other   Dressing secured with:  Elastic tubular stocking and Size F   Comments:  Xeroform

## 2025-06-10 NOTE — PROGRESS NOTES
Patient ID: Kim Kwon is a 79 y.o. female Date of Birth 1945       Chief Complaint   Patient presents with    Follow Up Wound Care Visit     RLE wound        Allergies:  Codeine    Diagnosis:  1. Traumatic open wound of right lower leg, initial encounter  -     Wound cleansing and dressings Right;Lateral;Lower Leg; Future; Expected date: 06/24/2025  -     lidocaine (LMX) 4 % cream  -     Wound Procedure Treatment Right;Lateral;Lower Leg     Diagnosis ICD-10-CM Associated Orders   1. Traumatic open wound of right lower leg, initial encounter  S81.801A Wound cleansing and dressings Right;Lateral;Lower Leg     lidocaine (LMX) 4 % cream     Wound Procedure Treatment Right;Lateral;Lower Leg           Assessment & Plan:  See wound orders.         -Right distal anterior lateral leg wound   -Increase in size with adjacent ulceration developing, original wound bed appears almost healed   -Change dressing with Xeroform, change cover to known silicone as adhesive may have injured skin   -No SOI   -No debridement performed  -DM2 A1c 6.6% 12/2024  -Patient counseled on importance of lower extremity elevation for decrease in edema  -Continue Spendagrip compression bilaterally to help with lower extremity edema  -Patient counseled on importance of strict glycemic control and high-protein diet  -Exercise regularly to help with lower extremity edema   -Patient counseled on clinical signs of infection present to the ED if these occur  -Follow-up 2 weeks    Subjective:   HPI    6/10/25 presents for continued valuation of right lower extremity traumatic wound.  Original wound site appears to be almost totally resolved.  Unfortunately patient states that she noticed a rub to her skin possibly from the dressing or possibly from trauma right next to her original wound site.  She denies any redness, swelling or drainage from the area.  She has been utilizing Dermagran as advised.  She denies any systemic signs of  infection    5/27/2025 patient presents for evaluation of right lower extremity traumatic wound.  Patient has been adherent to wound care.  She has been adherent to compression and elevation.  She denies any increase in redness, swelling, or drainage.  She denies any systemic signs of infection     5/13/2025 bilateral lower extremity traumatic wounds.  Patient states left lower extremity wound appears healed.  Patient is still treating right lower extremity wound.  She denies any redness, swelling, drainage.  She denies any clinical signs of infection     4/29/25 patient presents for continued evaluation and management of bilateral lower extremity wounds.  Left leg traumatic wound and right lower extremity wound.  Patient denies any increase in redness, swelling, or drainage.  She has been wearing the lower extremity compression as directed     4/15/25 left leg traumatic wound almost healed.  Right lower extremity wound still present.  Patient denies any systemic signs of infection.  Patient states she does have itching sensation around right leg wound at times.  She denies any increase in drainage.  She denies any increase in redness or swelling.     4/1/25 patient presents for continued evaluation and management of bilateral lower extremity wounds.  Patient states she has had almost no drainage from the left lower extremity wound and only minimal drainage from the right.  She denies any redness, swelling, purulence, or pain increase.  She has been keeping areas dry and has been adherent to wound care.     3/18/2025 patient presents for continued evaluation and management of bilateral lower extremity wounds.  Patient states she has been compliant with wound care.  She denies any redness, swelling, or significant drainage.  Patient denies any pain in the area.  She denies any systemic signs of infection such as nausea, vomiting, fever, chills, shortness of breath        3/4/2025 patient presents for continued  evaluation of left leg traumatic wound and right leg wound.  Patient states she has been adherent to dressing changes.  She denies any increase in redness or swelling.  She denies any changes in drainage.  Patient states she has been adherent to wound care.  She denies nausea, vomiting, fever, chills, shortness of breath     2/25/2025 patient presents for evaluation management of left anterior lateral leg wound and right distal lateral leg wound.  Patient has been adherent with dressing changes and compression.  She denies any increase in pain, swelling, or drainage.  She denies any nausea, vomiting, fever, chills, shortness of breath     2/18/2025 patient presents for evaluation and management of left anterior lateral leg wound of traumatic origin.  She has been adherent to wound care.  She denies any redness, swelling, drainage changes, or pain.  Patient reports a new wound on the right anterior lateral leg.  She states she hit her leg over a month ago and a scab had been present.  The scab fell off with underlying wound being noted.  She states the area has mild inflammation but she denies any purulence, swelling, or significant pain.  Denies nausea, vomiting, fever, chills, shortness of breath     2/4/2025 patient presents for further evaluation and management of left anterior lateral leg wound of traumatic origin.  Patient has been compliant with dressing changes, compression, and elevation.  She denies any pain, swelling, redness in the area.  She denies any systemic signs of infection such as nausea, vomiting, fever, chills, shortness of breath     1/16/2025 patient presents today for further evaluation and management of left anterior lateral leg traumatic wound sustained in October with delayed healing.  Patient has been adherent to wound care.  She denies any significant changes in wound.  Today she completes her oral doxycycline that was prescribed by her PCP.  Denies any clinical signs of infection.   Patient states she had her vascular test performed yesterday afternoon however she has not received the results and I have not received results.  Patient denies nausea, vomiting, fever, chills, shortness of breath        1/9/2025 Patient presents for evaluation and management of left anterior leg traumatic wound delayed healing.  Injury was sustained in October and has been managed by her primary care physician.  She states the area will begin to heal and then declined.  She reports mild to moderate drainage, serosanguineous.  Patient reports intermittent pain in the area of wound with activity and while resting.  She denies any significant swelling or redness at the site of injury however has mild bilateral lower extremity swelling.  Patient initially was using nonstick dressing over the area after cleaning with warm soapy water.  Recently she was advised to leave the area open to air.  She had a slight increase in redness in the area and was prescribed doxycycline by her primary care physician on 1/7/2025 and referred to wound care for further evaluation and management.  Patient denies nausea, vomiting, fever, chills, shortness of breath    The following portions of the patient's history were reviewed and updated as appropriate:   Problem List[1]  Past Medical History[2]  Past Surgical History[3]  Social History[4]   Current Medications[5]  Family History[6]   Review of Systems    Constitutional:  Negative for chills and fever.   Respiratory:  Negative for chest tightness and shortness of breath.    Cardiovascular:  Positive for leg swelling.   Musculoskeletal:  Positive for arthralgias, back pain and gait problem.   Skin:  Positive for wound.    Objective:  /80   Pulse 84   Temp (!) 96.5 °F (35.8 °C)   Resp 18     Physical Exam  Constitutional:       General: She is not in acute distress.     Appearance: She is not ill-appearing.   Cardiovascular:      Comments: Left DP pulse 1/4, PT pulse 0/4  CRT less  than 3 seconds  Absent hair growth to distal digits  Mild nonpitting edema to bilateral lower extremities  Pulmonary:      Effort: No respiratory distress.   Musculoskeletal:      Comments: MMT 5/5 at level of ankle bilaterally  No pain with palpitation of posterior calves bilaterally   Skin:     Comments:      Right lower extremity distal anterior lateral traumatic wound.  Granular base with almost complete epithelialization however adjacent area with open ulceration with primarily granular base.  No erythema.  No significant edema.  Nontender with palpation.  no serous drainage expressed.  No purulence expressed, no crepitus, no fluctuance, no malodor      Wound 02/18/25 Leg Right;Lateral;Lower (Active)   Wound Image   06/10/25 1121   Wound Description Yellow;Pink;Epithelialization;Slough;Granulation tissue 06/10/25 1123   Non-staged Wound Description Full thickness 06/10/25 1123   Wound Length (cm) 2 cm 06/10/25 1123   Wound Width (cm) 0.4 cm 06/10/25 1123   Wound Depth (cm) 0.1 cm 06/10/25 1123   Wound Surface Area (cm^2) 0.63 cm^2 06/10/25 1123   Wound Volume (cm^3) 0.042 cm^3 06/10/25 1123   Calculated Wound Volume (cm^3) 0.08 cm^3 06/10/25 1123   Change in Wound Size % -60 06/10/25 1123   Drainage Amount Scant 06/10/25 1123   Drainage Description Serosanguineous 06/10/25 1123   Sabrina-wound Assessment Pink;Dry;Scaly 06/10/25 1123                         Procedures             Wound Instructions:  Orders Placed This Encounter   Procedures    Wound cleansing and dressings Right;Lateral;Lower Leg     Right;Lateral;Lower Leg               Wash your hands with soap and water.  Remove old dressing, discard into plastic bag and place in trash.  Cleanse the wound with mild soap and water  prior to applying a clean dressing. Do not use tissue or cotton balls. Do not scrub the wound. Pat dry using gauze.  Shower no do not get dressings wet may cover with cast cover purchase at [facility] or [facility]      Apply xeroform right  "leg wound.    Cover with bordered gauze  Change dressing every other day      Elastic Tubular Stocking: Spandagrip F     Tubular elastic bandage: Apply from base of toes to behind the knee. Apply in AM, may remove for sleep.  Avoid prolonged standing in one place.  Elevate leg(s) above the level of the heart when sitting or as much as possible.        Please try to consume 3-4 servings of protein (30g) each day.   - Each serving of protein should contain about 30 mg protein.   - Good sources of protein include, lean meats (fish, chicken, etc), eggs, dairy products (yogurt, cheese), tofu, legumes (chickpeas, lentils, peas, black beans), nuts (cashew, walnut, peanuts, etc), & quinoa.       If you develop fever, chills, nausea or vomiting, increase in drainage or foul smelling drainage go to the closest emergency department for evaluation.          Follow up in 2 weeks     Standing Status:   Future     Expected Date:   6/24/2025     Expiration Date:   6/24/2025    Wound Procedure Treatment Right;Lateral;Lower Leg     This order was created via procedure documentation         Jessica Azevedo DPM      Portions of the record may have been created with voice recognition software. Occasional wrong word or \"sound a like\" substitutions may have occurred due to the inherent limitations of voice recognition software. Read the chart carefully and recognize, using context, where substitutions have occurred.          [1] There is no problem list on file for this patient.   [2]   Past Medical History:  Diagnosis Date    Diabetes mellitus (HCC)     Diverticulitis     GERD (gastroesophageal reflux disease)     Hypertension    [3]   Past Surgical History:  Procedure Laterality Date    CHOLECYSTECTOMY      TONSILLECTOMY     [4]   Social History  Socioeconomic History    Marital status:    Tobacco Use    Smoking status: Never    Smokeless tobacco: Never   Vaping Use    Vaping status: Never Used     Social Drivers of Health "     Financial Resource Strain: Not At Risk (4/15/2025)    Received from [facility]    Financial Insecurity     In the last 12 months did you skip medications to save money?: No     In the last 12 months was there a time when you needed to see a doctor but could not because of cost?: No   Food Insecurity: No Food Insecurity (4/15/2025)    Received from [facility]    Food Insecurity     In the last 12 months did you ever eat less than you felt you should because there wasn't enough money for food?: No   Transportation Needs: No Transportation Needs (4/15/2025)    Received from [facility]    Transportation Needs     In the last 12 months have you ever had to go without healthcare because you didn't have a way to get there?: No   Stress: No Stress Concern Present (8/23/2023)    Received from [facility]    [employer] - Occupational Stress Questionnaire     Feeling of Stress : Not at all   Social Connections: Socially Integrated (4/15/2025)    Received from [facility]    Social Connection     Do you often feel lonely?: No   Intimate Partner Violence: Not At Risk (8/23/2023)    Received from [facility]    Humiliation, Afraid, Rape, and Kick questionnaire     Fear of Current or Ex-Partner: No     Emotionally Abused: No     Physically Abused: No     Sexually Abused: No   Housing Stability: Not At Risk (4/15/2025)    Received from [facility]    Housing Stability     Are you worried that in the next 2 months you may not have stable housing?: No   [5]   Current Outpatient Medications:     Ascorbic Acid (vitamin C) 100 MG tablet, Take 100 mg by mouth daily, Disp: , Rfl:     aspirin (ECOTRIN LOW STRENGTH) 81 mg EC tablet, Take 81 mg by mouth daily, Disp: , Rfl:     Blood Glucose Monitoring Suppl (FreeStyle Lite) w/Device KIT, USE AS DIRECTED E11.9, Disp: , Rfl:      Cholecalciferol 25 MCG (1000 UT) capsule, Take 5,000 Units by mouth daily, Disp: , Rfl:     esomeprazole (NexIUM) 40 MG capsule, Take 40 mg by mouth if needed, Disp: , Rfl:     FREESTYLE LITE test strip, USE 1 STRIP TO CHECK GLUCOSE ONCE DAILY, Disp: , Rfl:     ibuprofen (MOTRIN) 200 mg tablet, Take 200 mg by mouth every 6 (six) hours as needed, Disp: , Rfl:     meloxicam (MOBIC) 15 mg tablet, Take 15 mg by mouth if needed, Disp: , Rfl:     metFORMIN (GLUCOPHAGE-XR) 500 mg 24 hr tablet, Take 500 mg by mouth daily with breakfast, Disp: , Rfl:     metoprolol succinate (TOPROL-XL) 50 mg 24 hr tablet, Take 50 mg by mouth daily, Disp: , Rfl:     Multiple Vitamins-Minerals (CENTRUM SILVER PO), Take by mouth, Disp: , Rfl:     mupirocin (BACTROBAN) 2 % ointment, Apply topically daily, Disp: 30 g, Rfl: 1    olmesartan-hydrochlorothiazide (BENICAR HCT) 20-12.5 MG per tablet, Take 1 tablet by mouth daily, Disp: , Rfl:     triamcinolone (KENALOG) 0.1 % cream, Apply topically 2 (two) times a day, Disp: 15 g, Rfl: 2  No current facility-administered medications for this visit.  [6] No family history on file.

## 2025-06-24 ENCOUNTER — OFFICE VISIT (OUTPATIENT)
Dept: WOUND CARE | Facility: CLINIC | Age: 80
End: 2025-06-24
Payer: MEDICARE

## 2025-06-24 VITALS
TEMPERATURE: 47.8 F | HEART RATE: 80 BPM | SYSTOLIC BLOOD PRESSURE: 138 MMHG | DIASTOLIC BLOOD PRESSURE: 62 MMHG | RESPIRATION RATE: 16 BRPM

## 2025-06-24 DIAGNOSIS — S81.801A TRAUMATIC OPEN WOUND OF RIGHT LOWER LEG, INITIAL ENCOUNTER: Primary | ICD-10-CM

## 2025-06-24 PROCEDURE — 99213 OFFICE O/P EST LOW 20 MIN: CPT | Performed by: PODIATRIST

## 2025-06-24 PROCEDURE — 99212 OFFICE O/P EST SF 10 MIN: CPT | Performed by: PODIATRIST

## 2025-06-24 NOTE — PROGRESS NOTES
Wound Procedure Treatment Right;Lateral;Lower Leg    Performed by: Joie Meneses RN  Authorized by: Jessica Azevedo DPM  Associated wounds:   Wound 02/18/25 Leg Right;Lateral;Lower    Wound cleansed with:  NSS   Applied primary dressing:  Other   Applied secondary dressing:  Other   Dressing secured with:  Size F   Comments:  Xeroform, bordered gauze

## 2025-06-24 NOTE — PROGRESS NOTES
Patient ID: Kim Kwon is a 79 y.o. female Date of Birth 1945       Chief Complaint   Patient presents with    Follow Up Wound Care Visit     Wound RLE       Allergies:  Codeine    Diagnosis:  1. Traumatic open wound of right lower leg, initial encounter  -     Wound cleansing and dressings Right;Lateral;Lower Leg; Future; Expected date: 07/08/2025  -     Wound Procedure Treatment Right;Lateral;Lower Leg     Diagnosis ICD-10-CM Associated Orders   1. Traumatic open wound of right lower leg, initial encounter  S81.801A Wound cleansing and dressings Right;Lateral;Lower Leg     Wound Procedure Treatment Right;Lateral;Lower Leg           Assessment & Plan:  See wound orders.      -Right distal anterior lateral leg wound              - Significant improvement noted with epithelialization to part of wound              - Continue Xeroform and light compression              -No SOI              -No surgical or selective debridement performed.  Wound mechanically debrided with gauze  -DM2 A1c 6.6% 12/2024  -Patient counseled on importance of lower extremity elevation for decrease in edema  -Continue Spendagrip compression bilaterally to help with lower extremity edema  -Patient counseled on importance of strict glycemic control and high-protein diet  -Exercise regularly to help with lower extremity edema   -Cardiology note reviewed 6/5/2025  -Patient counseled on clinical signs of infection present to the ED if these occur  -Follow-up 2 weeks    Subjective:   HPI patient presents for continued evaluation and management of right leg traumatic wound.  Patient has been adherent to some Xeroform and dressing changes.  She has been using compression as directed.  Patient denies any increasing redness, swelling, or drainage.  She denies any systemic signs of infection    The following portions of the patient's history were reviewed and updated as appropriate:   Problem List[1]  Past Medical History[2]  Past Surgical  History[3]  Social History[4]   Current Medications[5]  Family History[6]   Review of Systems    Constitutional:  Negative for chills and fever.   Respiratory:  Negative for chest tightness and shortness of breath.    Cardiovascular:  Positive for leg swelling.   Musculoskeletal:  Positive for arthralgias, back pain and gait problem.   Skin:  Positive for wound.    Objective:  /62   Pulse 80   Temp (!) 47.8 °F (8.8 °C)   Resp 16     Physical Exam    Constitutional:       General: She is not in acute distress.     Appearance: She is not ill-appearing.   Cardiovascular:      Comments: Left DP pulse 1/4, PT pulse 0/4  CRT less than 3 seconds  Absent hair growth to distal digits  Mild nonpitting edema to bilateral lower extremities  Pulmonary:      Effort: No respiratory distress.   Musculoskeletal:      Comments: MMT 5/5 at level of ankle bilaterally  No pain with palpitation of posterior calves bilaterally   Skin:     Comments:      Right lower extremity distal anterior lateral traumatic wound.  Fibrogranular base.  No erythema.  No significant edema.  Nontender with palpation.  no serous drainage expressed.  No purulence expressed, no crepitus, no fluctuance, no malodor    Wound 02/18/25 Leg Right;Lateral;Lower (Active)   Wound Image   06/24/25 1025   Wound Description Pink 06/24/25 1025   Non-staged Wound Description Partial thickness 06/24/25 1025   Wound Length (cm) 0.5 cm 06/24/25 1025   Wound Width (cm) 0.4 cm 06/24/25 1025   Wound Depth (cm) 0.1 cm 06/24/25 1025   Wound Surface Area (cm^2) 0.16 cm^2 06/24/25 1025   Wound Volume (cm^3) 0.01 cm^3 06/24/25 1025   Calculated Wound Volume (cm^3) 0.02 cm^3 06/24/25 1025   Change in Wound Size % 60 06/24/25 1025   Drainage Amount Scant 06/24/25 1025   Drainage Description Serous 06/24/25 1025   Sabrina-wound Assessment Pink;Dry;Scaly 06/24/25 1025                           Procedures             Wound Instructions:  Orders Placed This Encounter   Procedures     "Wound cleansing and dressings Right;Lateral;Lower Leg     Wash your hands with soap and water.  Remove old dressing, discard into plastic bag and place in trash.  Cleanse the wound with mild soap and water  prior to applying a clean dressing. Do not use tissue or cotton balls. Do not scrub the wound. Pat dry using gauze.  Shower no do not get dressings wet may cover with cast cover purchase at [employer] or [employer]      Apply xeroform right leg wound.    Cover with bordered gauze  Change dressing every other day      Elastic Tubular Stocking: Spandagrip F     Tubular elastic bandage: Apply from base of toes to behind the knee. Apply in AM, may remove for sleep.  Avoid prolonged standing in one place.  Elevate leg(s) above the level of the heart when sitting or as much as possible.        Please try to consume 3-4 servings of protein (30g) each day.   - Each serving of protein should contain about 30 mg protein.   - Good sources of protein include, lean meats (fish, chicken, etc), eggs, dairy products (yogurt, cheese), tofu, legumes (chickpeas, lentils, peas, black beans), nuts (cashew, walnut, peanuts, etc), & quinoa.       If you develop fever, chills, nausea or vomiting, increase in drainage or foul smelling drainage go to the closest emergency department for evaluation.       Follow up in 2 weeks     Standing Status:   Future     Expected Date:   7/8/2025     Expiration Date:   7/8/2025    Wound Procedure Treatment Right;Lateral;Lower Leg     This order was created via procedure documentation         Jessica Azevedo DPM      Portions of the record may have been created with voice recognition software. Occasional wrong word or \"sound a like\" substitutions may have occurred due to the inherent limitations of voice recognition software. Read the chart carefully and recognize, using context, where substitutions have occurred.          [1] There is no problem list on file for this patient.   [2]   Past Medical " History:  Diagnosis Date    Diabetes mellitus (HCC)     Diverticulitis     GERD (gastroesophageal reflux disease)     Hypertension    [3]   Past Surgical History:  Procedure Laterality Date    CHOLECYSTECTOMY      TONSILLECTOMY     [4]   Social History  Socioeconomic History    Marital status:    Tobacco Use    Smoking status: Never    Smokeless tobacco: Never   Vaping Use    Vaping status: Never Used     Social Drivers of Health     Financial Resource Strain: Not At Risk (4/15/2025)    Received from [facility]    Financial Insecurity     In the last 12 months did you skip medications to save money?: No     In the last 12 months was there a time when you needed to see a doctor but could not because of cost?: No   Food Insecurity: No Food Insecurity (4/15/2025)    Received from [facility]    Food Insecurity     In the last 12 months did you ever eat less than you felt you should because there wasn't enough money for food?: No   Transportation Needs: No Transportation Needs (4/15/2025)    Received from [facility]    Transportation Needs     In the last 12 months have you ever had to go without healthcare because you didn't have a way to get there?: No   Stress: No Stress Concern Present (8/23/2023)    Received from [facility]    [employer] [place] [employer] - Occupational Stress Questionnaire     Feeling of Stress : Not at all   Social Connections: Socially Integrated (4/15/2025)    Received from [facility]    Social Connection     Do you often feel lonely?: No   Intimate Partner Violence: Not At Risk (8/23/2023)    Received from [facility]    Humiliation, Afraid, Rape, and Kick questionnaire     Fear of Current or Ex-Partner: No     Emotionally Abused: No     Physically Abused: No     Sexually Abused: No   Housing Stability: Not At Risk (4/15/2025)    Received from [facility]    Housing Stability     Are you worried that in the next 2 months you may not have stable housing?: No   [5]   Current Outpatient Medications:     Ascorbic Acid (vitamin C) 100 MG tablet, Take 100 mg by mouth daily, Disp: , Rfl:     aspirin (ECOTRIN LOW STRENGTH) 81 mg EC tablet, Take 81 mg by mouth daily, Disp: , Rfl:     Blood Glucose Monitoring Suppl (FreeStyle Lite) w/Device KIT, USE AS DIRECTED E11.9, Disp: , Rfl:     Cholecalciferol 25 MCG (1000 UT) capsule, Take 5,000 Units by mouth daily, Disp: , Rfl:     esomeprazole (NexIUM) 40 MG capsule, Take 40 mg by mouth if needed, Disp: , Rfl:     FREESTYLE LITE test strip, USE 1 STRIP TO CHECK GLUCOSE ONCE DAILY, Disp: , Rfl:     ibuprofen (MOTRIN) 200 mg tablet, Take 200 mg by mouth every 6 (six) hours as needed, Disp: , Rfl:     meloxicam (MOBIC) 15 mg tablet, Take 15 mg by mouth if needed, Disp: , Rfl:     metFORMIN (GLUCOPHAGE-XR) 500 mg 24 hr tablet, Take 500 mg by mouth daily with breakfast, Disp: , Rfl:     metoprolol succinate (TOPROL-XL) 50 mg 24 hr tablet, Take 50 mg by mouth daily, Disp: , Rfl:     Multiple Vitamins-Minerals (CENTRUM SILVER PO), Take by mouth, Disp: , Rfl:     mupirocin (BACTROBAN) 2 % ointment, Apply topically daily, Disp: 30 g, Rfl: 1    olmesartan-hydrochlorothiazide (BENICAR HCT) 20-12.5 MG per tablet, Take 1 tablet by mouth daily, Disp: , Rfl:     triamcinolone (KENALOG) 0.1 % cream, Apply topically 2 (two) times a day, Disp: 15 g, Rfl: 2  [6] No family history on file.

## 2025-06-24 NOTE — PROGRESS NOTES
Wound Procedure Treatment Right;Lateral;Lower Leg    Performed by: Antonia Conrad RN  Authorized by: Jessica Azevedo DPM  Associated wounds:   Wound 02/18/25 Leg Right;Lateral;Lower    Wound cleansed with:  Wound aggrssively cleansed with NSS and gauze

## 2025-06-24 NOTE — PATIENT INSTRUCTIONS
Orders Placed This Encounter   Procedures    Wound cleansing and dressings Right;Lateral;Lower Leg     Wash your hands with soap and water.  Remove old dressing, discard into plastic bag and place in trash.  Cleanse the wound with mild soap and water  prior to applying a clean dressing. Do not use tissue or cotton balls. Do not scrub the wound. Pat dry using gauze.  Shower no do not get dressings wet may cover with cast cover purchase at [employer] or [employer]      Apply xeroform right leg wound.    Cover with bordered gauze  Change dressing every other day      Elastic Tubular Stocking: Spandagrip F     Tubular elastic bandage: Apply from base of toes to behind the knee. Apply in AM, may remove for sleep.  Avoid prolonged standing in one place.  Elevate leg(s) above the level of the heart when sitting or as much as possible.        Please try to consume 3-4 servings of protein (30g) each day.   - Each serving of protein should contain about 30 mg protein.   - Good sources of protein include, lean meats (fish, chicken, etc), eggs, dairy products (yogurt, cheese), tofu, legumes (chickpeas, lentils, peas, black beans), nuts (cashew, walnut, peanuts, etc), & quinoa.       If you develop fever, chills, nausea or vomiting, increase in drainage or foul smelling drainage go to the closest emergency department for evaluation.       Follow up in 2 weeks     Standing Status:   Future     Expected Date:   7/8/2025     Expiration Date:   7/8/2025

## 2025-06-25 ENCOUNTER — OFFICE VISIT (OUTPATIENT)
Age: 80
End: 2025-06-25
Payer: MEDICARE

## 2025-06-25 VITALS
TEMPERATURE: 97.6 F | HEART RATE: 81 BPM | OXYGEN SATURATION: 98 % | DIASTOLIC BLOOD PRESSURE: 82 MMHG | BODY MASS INDEX: 37.31 KG/M2 | WEIGHT: 204 LBS | SYSTOLIC BLOOD PRESSURE: 128 MMHG

## 2025-06-25 DIAGNOSIS — N20.0 NEPHROLITHIASIS: Primary | ICD-10-CM

## 2025-06-25 PROCEDURE — 99213 OFFICE O/P EST LOW 20 MIN: CPT

## 2025-06-25 RX ORDER — METOPROLOL TARTRATE 50 MG
1.5 TABLET ORAL 2 TIMES DAILY
COMMUNITY
Start: 2025-05-22

## 2025-06-25 NOTE — PROGRESS NOTES
Name: Kim Kwon      : 1945      MRN: [id number]  Encounter Provider: ADRIANA Ngo  Encounter Date: 2025   Encounter department: [facility]    :  Assessment & Plan  Nephrolithiasis  Surveillance ultrasound shows a small 3 mm stone in the right lower pole.  I am recommending continued surveillance.  If her ultrasound in 1 year does not show any significant growth or increase stone burden I think it would be reasonable to discontinue annual screening.  Continue with at least 64 ounces of water per day and she was instructed to continue adding lemon juice.  Follow-up in 1 year with ultrasound.    Orders:    US kidney and bladder; Future          Interval HPI:    She presents today reporting doing well and offers no complaints.  We reviewed her ultrasound which shows a small 3 mm stone in the right lower pole.  She has had no issues with flank pain, gross hematuria, or recurrent UTIs since last I saw her.    History of Present Illness     Established patient last seen by me in 2024 with history of nephrolithiasis.  She had previously been following with [facility] urology last seen by them in 2022. Her first stone episode was in the early 's then in  and . She had 2 lithotripsy procedures with last in . She is on K citrate 20 mill equivalents twice daily.  Last stone episode was 2023 after she had acute onset left flank pain.  She presented the ER was diagnosed with a 3 mm stone in the proximal left ureter.  Surveillance ultrasound from 2025 shows a small 3 mm nonobstructing stone in the right lower pole.  She has parapelvic cyst in both kidneys without significant change.      Objective   There were no vitals taken for this visit.    Review of Systems   Constitutional:  Negative for chills and fever.   HENT:  Negative for ear pain and sore throat.    Eyes:  Negative for pain and visual disturbance.   Respiratory:  Negative for cough and  shortness of breath.    Cardiovascular:  Negative for chest pain and palpitations.   Gastrointestinal:  Negative for abdominal pain and vomiting.   Genitourinary:  Negative for dysuria and hematuria.   Musculoskeletal:  Negative for arthralgias and back pain.   Skin:  Negative for color change and rash.   Neurological:  Negative for seizures and syncope.   All other systems reviewed and are negative.      Physical Exam  Vitals and nursing note reviewed.   Constitutional:       General: She is not in acute distress.     Appearance: She is well-developed.   HENT:      Head: Normocephalic and atraumatic.     Eyes:      Conjunctiva/sclera: Conjunctivae normal.       Cardiovascular:      Rate and Rhythm: Normal rate and regular rhythm.      Heart sounds: No murmur heard.  Pulmonary:      Effort: Pulmonary effort is normal. No respiratory distress.      Breath sounds: Normal breath sounds.   Abdominal:      Palpations: Abdomen is soft.      Tenderness: There is no abdominal tenderness.     Musculoskeletal:         General: No swelling.      Cervical back: Neck supple.     Skin:     General: Skin is warm and dry.      Capillary Refill: Capillary refill takes less than 2 seconds.     Neurological:      Mental Status: She is alert.     Psychiatric:         Mood and Affect: Mood normal.           Imaging:          Please Note:  Voice dictation software has been used to create this document. There may be inadvertent transcriptions errors.     ADRIANA Ngo 06/25/25

## 2025-07-08 ENCOUNTER — OFFICE VISIT (OUTPATIENT)
Dept: WOUND CARE | Facility: CLINIC | Age: 80
End: 2025-07-08
Payer: MEDICARE

## 2025-07-08 VITALS
TEMPERATURE: 97.4 F | DIASTOLIC BLOOD PRESSURE: 76 MMHG | SYSTOLIC BLOOD PRESSURE: 120 MMHG | HEART RATE: 68 BPM | RESPIRATION RATE: 18 BRPM

## 2025-07-08 DIAGNOSIS — S81.801A TRAUMATIC OPEN WOUND OF RIGHT LOWER LEG, INITIAL ENCOUNTER: Primary | ICD-10-CM

## 2025-07-08 PROCEDURE — 99213 OFFICE O/P EST LOW 20 MIN: CPT | Performed by: PODIATRIST

## 2025-07-08 NOTE — PROGRESS NOTES
Patient ID: Kim Kwon is a 79 y.o. female Date of Birth 1945       Chief Complaint   Patient presents with    Follow Up Wound Care Visit     RLE wound       Allergies:  Codeine    Diagnosis:  1. Traumatic open wound of right lower leg, initial encounter  -     Wound cleansing and dressings Right;Lateral;Lower Leg; Future  -     Wound Procedure Treatment Right;Lateral;Lower Leg     Diagnosis ICD-10-CM Associated Orders   1. Traumatic open wound of right lower leg, initial encounter  S81.801A Wound cleansing and dressings Right;Lateral;Lower Leg     Wound Procedure Treatment Right;Lateral;Lower Leg           Assessment & Plan:  See wound orders.      -Right distal anterior lateral leg wound              - Almost healed, continues to improve              - Continue Xeroform and light compression              -No SOI              -No surgical or selective debridement performed.   -DM2 A1c 6.6% 12/2024  -Patient counseled on importance of lower extremity elevation for decrease in edema, patient has been compliant  -Continue Spendagrip compression bilaterally to help with lower extremity edema  -Patient counseled on importance of strict glycemic control and high-protein diet  -Exercise regularly to help with lower extremity edema   -Patient counseled on clinical signs of infection present to the ED if these occur  -Follow-up 2 weeks    Subjective:   HPI  Patient presents for continued evaluation management of right lower extremity wound.  Patient has been compliant with dressing changes and compression.  She tries to elevate his legs is much as possible.  She denies any local or systemic signs of infection      The following portions of the patient's history were reviewed and updated as appropriate:   Problem List[1]  Past Medical History[2]  Past Surgical History[3]  Social History[4]   Current Medications[5]  Family History[6]   Review of Systems  Constitutional:  Negative for chills and fever.   Respiratory:   Negative for chest tightness and shortness of breath.    Cardiovascular:  Positive for leg swelling.   Musculoskeletal:  Positive for arthralgias, back pain and gait problem.   Skin:  Positive for wound.    Objective:  /76   Pulse 68   Temp (!) 97.4 °F (36.3 °C)   Resp 18     Physical Exam  Constitutional:       General: She is not in acute distress.     Appearance: She is not ill-appearing.   Cardiovascular:      Comments: Left DP pulse 1/4, PT pulse 0/4  CRT less than 3 seconds  Absent hair growth to distal digits  Mild nonpitting edema to bilateral lower extremities  Pulmonary:      Effort: No respiratory distress.   Musculoskeletal:      Comments: MMT 5/5 at level of ankle bilaterally  No pain with palpitation of posterior calves bilaterally   Skin:     Comments:      Right lower extremity distal anterior lateral traumatic wound.  Granular base no erythema.  No significant edema.  Nontender with palpation.  no serous drainage expressed.  No purulence expressed, no crepitus, no fluctuance, no malodor      Wound 02/18/25 Leg Right;Lateral;Lower (Active)   Wound Image   07/08/25 1037   Wound Description Pink 07/08/25 1036   Non-staged Wound Description Partial thickness 07/08/25 1036   Wound Length (cm) 0.2 cm 07/08/25 1036   Wound Width (cm) 0.1 cm 07/08/25 1036   Wound Depth (cm) 0.1 cm 07/08/25 1036   Wound Surface Area (cm^2) 0.02 cm^2 07/08/25 1036   Wound Volume (cm^3) 0.001 cm^3 07/08/25 1036   Calculated Wound Volume (cm^3) 0 cm^3 07/08/25 1036   Change in Wound Size % 100 07/08/25 1036   Drainage Amount Scant 07/08/25 1036   Drainage Description Serous 07/08/25 1036   Sabrina-wound Assessment Pink;Dry;Scaly 07/08/25 1036                         Procedures             Wound Instructions:  Orders Placed This Encounter   Procedures    Wound cleansing and dressings Right;Lateral;Lower Leg     Wound cleansing and dressings Right;Lateral;Lower Leg       Wash your hands with soap and water.  Remove old  "dressing, discard into plastic bag and place in trash.  Cleanse the wound with mild soap and water  prior to applying a clean dressing. Do not use tissue or cotton balls. Do not scrub the wound. Pat dry using gauze.  Shower no do not get dressings wet may cover with cast cover purchase at [employer] or [employer]      Apply xeroform right leg wound.    Cover with bordered gauze  Change dressing every other day      Elastic Tubular Stocking: Spandagrip F     Tubular elastic bandage: Apply from base of toes to behind the knee. Apply in AM, may remove for sleep.  Avoid prolonged standing in one place.  Elevate leg(s) above the level of the heart when sitting or as much as possible.        Please try to consume 3-4 servings of protein (30g) each day.   - Each serving of protein should contain about 30 mg protein.   - Good sources of protein include, lean meats (fish, chicken, etc), eggs, dairy products (yogurt, cheese), tofu, legumes (chickpeas, lentils, peas, black beans), nuts (cashew, walnut, peanuts, etc), & quinoa.       If you develop fever, chills, nausea or vomiting, increase in drainage or foul smelling drainage go to the closest emergency department for evaluation.       Follow up in 2 weeks     Standing Status:   Future     Expiration Date:   7/15/2025    Wound Procedure Treatment Right;Lateral;Lower Leg     This order was created via procedure documentation         Jessica Azevedo DPM      Portions of the record may have been created with voice recognition software. Occasional wrong word or \"sound a like\" substitutions may have occurred due to the inherent limitations of voice recognition software. Read the chart carefully and recognize, using context, where substitutions have occurred.            [1] There is no problem list on file for this patient.   [2]   Past Medical History:  Diagnosis Date    Diabetes mellitus (HCC)     Diverticulitis     GERD (gastroesophageal reflux disease)     Hypertension    [3] "   Past Surgical History:  Procedure Laterality Date    CHOLECYSTECTOMY      TONSILLECTOMY     [4]   Social History  Socioeconomic History    Marital status:    Tobacco Use    Smoking status: Never    Smokeless tobacco: Never   Vaping Use    Vaping status: Never Used     Social Drivers of Health     Financial Resource Strain: Not At Risk (4/15/2025)    Received from [facility]    Financial Insecurity     In the last 12 months did you skip medications to save money?: No     In the last 12 months was there a time when you needed to see a doctor but could not because of cost?: No   Food Insecurity: No Food Insecurity (4/15/2025)    Received from [facility]    Food Insecurity     In the last 12 months did you ever eat less than you felt you should because there wasn't enough money for food?: No   Transportation Needs: No Transportation Needs (4/15/2025)    Received from [facility]    Transportation Needs     In the last 12 months have you ever had to go without healthcare because you didn't have a way to get there?: No   Stress: No Stress Concern Present (8/23/2023)    Received from [facility]    [employer] [employer] - Occupational Stress Questionnaire     Feeling of Stress : Not at all   Social Connections: Socially Integrated (4/15/2025)    Received from [facility]    Social Connection     Do you often feel lonely?: No   Intimate Partner Violence: Not At Risk (8/23/2023)    Received from [facility]    Humiliation, Afraid, Rape, and Kick questionnaire     Fear of Current or Ex-Partner: No     Emotionally Abused: No     Physically Abused: No     Sexually Abused: No   Housing Stability: Not At Risk (4/15/2025)    Received from [facility]    Housing Stability     Are you worried that in the next 2 months you may not have stable housing?: No   [5]   Current Outpatient  Medications:     Ascorbic Acid (vitamin C) 100 MG tablet, Take 100 mg by mouth in the morning., Disp: , Rfl:     aspirin (ECOTRIN LOW STRENGTH) 81 mg EC tablet, Take 81 mg by mouth in the morning., Disp: , Rfl:     Blood Glucose Monitoring Suppl (FreeStyle Lite) w/Device KIT, , Disp: , Rfl:     Cholecalciferol 25 MCG (1000 UT) capsule, Take 5,000 Units by mouth in the morning., Disp: , Rfl:     esomeprazole (NexIUM) 40 MG capsule, Take 40 mg by mouth if needed, Disp: , Rfl:     FREESTYLE LITE test strip, , Disp: , Rfl:     ibuprofen (MOTRIN) 200 mg tablet, Take 200 mg by mouth every 6 (six) hours as needed, Disp: , Rfl:     meloxicam (MOBIC) 15 mg tablet, Take 15 mg by mouth if needed, Disp: , Rfl:     metFORMIN (GLUCOPHAGE-XR) 500 mg 24 hr tablet, Take 500 mg by mouth daily with breakfast, Disp: , Rfl:     metoprolol succinate (TOPROL-XL) 50 mg 24 hr tablet, Take 50 mg by mouth in the morning., Disp: , Rfl:     metoprolol tartrate (LOPRESSOR) 50 mg tablet, Take 1.5 tablets by mouth in the morning and 1.5 tablets before bedtime., Disp: , Rfl:     Multiple Vitamins-Minerals (CENTRUM SILVER PO), Take by mouth, Disp: , Rfl:     mupirocin (BACTROBAN) 2 % ointment, Apply topically daily, Disp: 30 g, Rfl: 1    olmesartan-hydrochlorothiazide (BENICAR HCT) 20-12.5 MG per tablet, Take 1 tablet by mouth in the morning., Disp: , Rfl:     triamcinolone (KENALOG) 0.1 % cream, Apply topically 2 (two) times a day, Disp: 15 g, Rfl: 2  [6] No family history on file.

## 2025-07-08 NOTE — PROGRESS NOTES
Wound Procedure Treatment Right;Lateral;Lower Leg    Performed by: Antonia Conrad RN  Authorized by: Jessica Azevedo DPM  Associated wounds:   Wound 02/18/25 Leg Right;Lateral;Lower    Wound cleansed with:  NSS and Wound aggrssively cleansed with NSS and gauze   Applied primary dressing:  Non adherent contact layer   Applied secondary dressing:  Gauze   Dressing secured with:  Elastic tubular stocking, Size F, Tu and Tape

## 2025-07-08 NOTE — PATIENT INSTRUCTIONS
Orders Placed This Encounter   Procedures    Wound cleansing and dressings Right;Lateral;Lower Leg     Wound cleansing and dressings Right;Lateral;Lower Leg       Wash your hands with soap and water.  Remove old dressing, discard into plastic bag and place in trash.  Cleanse the wound with mild soap and water  prior to applying a clean dressing. Do not use tissue or cotton balls. Do not scrub the wound. Pat dry using gauze.  Shower no do not get dressings wet may cover with cast cover purchase at [employer] or [employer]      Apply xeroform right leg wound.    Cover with bordered gauze  Change dressing every other day      Elastic Tubular Stocking: Spandagrip F     Tubular elastic bandage: Apply from base of toes to behind the knee. Apply in AM, may remove for sleep.  Avoid prolonged standing in one place.  Elevate leg(s) above the level of the heart when sitting or as much as possible.        Please try to consume 3-4 servings of protein (30g) each day.   - Each serving of protein should contain about 30 mg protein.   - Good sources of protein include, lean meats (fish, chicken, etc), eggs, dairy products (yogurt, cheese), tofu, legumes (chickpeas, lentils, peas, black beans), nuts (cashew, walnut, peanuts, etc), & quinoa.       If you develop fever, chills, nausea or vomiting, increase in drainage or foul smelling drainage go to the closest emergency department for evaluation.       Follow up in 2 weeks     Standing Status:   Future     Expiration Date:   7/15/2025

## 2025-07-22 ENCOUNTER — OFFICE VISIT (OUTPATIENT)
Dept: WOUND CARE | Facility: CLINIC | Age: 80
End: 2025-07-22
Payer: MEDICARE

## 2025-07-22 VITALS
TEMPERATURE: 97 F | SYSTOLIC BLOOD PRESSURE: 130 MMHG | DIASTOLIC BLOOD PRESSURE: 78 MMHG | HEART RATE: 60 BPM | RESPIRATION RATE: 16 BRPM

## 2025-07-22 DIAGNOSIS — S81.801A TRAUMATIC OPEN WOUND OF RIGHT LOWER LEG, INITIAL ENCOUNTER: Primary | ICD-10-CM

## 2025-07-22 PROCEDURE — 99212 OFFICE O/P EST SF 10 MIN: CPT | Performed by: PODIATRIST

## 2025-07-22 PROCEDURE — 99213 OFFICE O/P EST LOW 20 MIN: CPT | Performed by: PODIATRIST

## 2025-07-22 RX ORDER — LIDOCAINE 40 MG/G
CREAM TOPICAL ONCE
Status: COMPLETED | OUTPATIENT
Start: 2025-07-22 | End: 2025-07-22

## 2025-07-22 RX ADMIN — LIDOCAINE 1 APPLICATION: 40 CREAM TOPICAL at 09:51

## 2025-07-22 NOTE — PATIENT INSTRUCTIONS
Orders Placed This Encounter   Procedures    Wound cleansing and dressings Right;Lateral;Lower Leg     Wash your hands with soap and water.  Remove old dressing, discard into plastic bag and place in trash.  Cleanse the wound with mild soap and water  prior to applying a clean dressing. Do not use tissue or cotton balls. Do not scrub the wound. Pat dry using gauze.  Shower no do not get dressings wet may cover with cast cover purchase at [employer] or [employer]      Apply dermagran right leg wound.    Cover with bordered gauze  Change dressing every other day      Elastic Tubular Stocking: Spandagrip E     Tubular elastic bandage: Apply from base of toes to behind the knee. Apply in AM, may remove for sleep.  Avoid prolonged standing in one place.  Elevate leg(s) above the level of the heart when sitting or as much as possible.        Please try to consume 3-4 servings of protein (30g) each day.   - Each serving of protein should contain about 30 mg protein.   - Good sources of protein include, lean meats (fish, chicken, etc), eggs, dairy products (yogurt, cheese), tofu, legumes (chickpeas, lentils, peas, black beans), nuts (cashew, walnut, peanuts, etc), & quinoa.       If you develop fever, chills, nausea or vomiting, increase in drainage or foul smelling drainage go to the closest emergency department for evaluation.       Follow up in 2 weeks     Standing Status:   Future     Expected Date:   7/22/2025     Expiration Date:   7/29/2025    Wound Procedure Treatment Right;Lateral;Lower Leg     This order was created via procedure documentation

## 2025-07-22 NOTE — PROGRESS NOTES
Wound Procedure Treatment Right;Lateral;Lower Leg    Performed by: Shae Espinal RN  Authorized by: Jessica Azevedo DPM  Associated wounds:   Wound 02/18/25 Leg Right;Lateral;Lower    Wound cleansed with:  NSS   Applied primary dressing:  Dermagran   Applied secondary dressing:  Other   Dressing secured with:  Elastic tubular stocking and Size E   Comments:  Bordered gauze

## 2025-07-22 NOTE — PROGRESS NOTES
Name: Kim Kwon      : 1945      MRN: [id number]  Encounter Provider: Jessica Azevedo DPM  Encounter Date: 2025   Encounter department: [facility]  :  Assessment & Plan  Traumatic open wound of right lower leg, initial encounter  -Right distal anterior lateral leg wound              - Stable wound without significant improvement              - Change dressing to Dermagran and increase compression              -No SOI              -No surgical or selective debridement performed.   -DM2 A1c 6.6% 2024  -Patient counseled on importance of lower extremity elevation for decrease in edema, patient has been compliant  -Continue Spendagrip compression bilaterally to help with lower extremity edema.  Increased compression at today's visit  -Patient counseled on importance of strict glycemic control and high-protein diet  -Exercise regularly to help with lower extremity edema   -Patient counseled on clinical signs of infection present to the ED if these occur  -Follow-up 2 weeks    Orders:    Wound cleansing and dressings Right;Lateral;Lower Leg; Future    lidocaine (LMX) 4 % cream    Wound Procedure Treatment Right;Lateral;Lower Leg        History of Present Illness   Chief Complaint   Patient presents with    Follow Up Wound Care Visit   Here for wound follow up.  HPI  Patient presents for evaluation and management of right lower extremity wound.  Wound stable without SOI.  Patient adherent to wound care.  She has been wearing compression however has had reported fluctuations in edema likely due to weather.  She keeps her legs elevated while resting.  Objective   /78   Pulse 60   Temp (!) 97 °F (36.1 °C)   Resp 16     Physical Exam  Constitutional:       General: She is not in acute distress.     Appearance: She is not ill-appearing.   Cardiovascular:      Comments: Left DP pulse 1/4, PT pulse 0/4  CRT less than 3 seconds  Absent hair growth to distal  digits  Mild nonpitting edema to bilateral lower extremities  Pulmonary:      Effort: No respiratory distress.   Musculoskeletal:      Comments: MMT 5/5 at level of ankle bilaterally  No pain with palpitation of posterior calves bilaterally   Skin:     Comments:      Right lower extremity distal anterior lateral traumatic wound.  Granular base no erythema.  No significant edema.  Nontender with palpation.  no serous drainage expressed.  No purulence expressed, no crepitus, no fluctuance, no malodor  Wound 02/18/25 Leg Right;Lateral;Lower (Active)   Wound Image   07/22/25 0945   Wound Description Epithelialization;Pink 07/22/25 0948   Non-staged Wound Description Full thickness 07/22/25 0948   Wound Length (cm) 0.2 cm 07/22/25 0948   Wound Width (cm) 0.1 cm 07/22/25 0948   Wound Depth (cm) 0.1 cm 07/22/25 0948   Wound Surface Area (cm^2) 0.02 cm^2 07/22/25 0948   Wound Volume (cm^3) 0.001 cm^3 07/22/25 0948   Calculated Wound Volume (cm^3) 0 cm^3 07/22/25 0948   Change in Wound Size % 100 07/22/25 0948   Drainage Amount Scant 07/22/25 0948   Drainage Description Bloody 07/22/25 0948   Sabrina-wound Assessment Scar Tissue 07/22/25 0948       Procedures

## 2025-08-05 ENCOUNTER — OFFICE VISIT (OUTPATIENT)
Dept: WOUND CARE | Facility: CLINIC | Age: 80
End: 2025-08-05
Payer: MEDICARE

## 2025-08-05 VITALS
HEART RATE: 90 BPM | SYSTOLIC BLOOD PRESSURE: 138 MMHG | RESPIRATION RATE: 18 BRPM | TEMPERATURE: 96.8 F | DIASTOLIC BLOOD PRESSURE: 80 MMHG

## 2025-08-05 DIAGNOSIS — S81.801A TRAUMATIC OPEN WOUND OF RIGHT LOWER LEG, INITIAL ENCOUNTER: Primary | ICD-10-CM

## 2025-08-05 PROCEDURE — 99212 OFFICE O/P EST SF 10 MIN: CPT | Performed by: PODIATRIST
